# Patient Record
Sex: FEMALE | Race: WHITE | URBAN - METROPOLITAN AREA
[De-identification: names, ages, dates, MRNs, and addresses within clinical notes are randomized per-mention and may not be internally consistent; named-entity substitution may affect disease eponyms.]

---

## 2017-04-24 ENCOUNTER — HOSPITAL ENCOUNTER (EMERGENCY)
Age: 22
Discharge: HOME | End: 2017-04-24
Attending: FAMILY MEDICINE

## 2017-04-24 VITALS
OXYGEN SATURATION: 100 % | HEIGHT: 66 IN | HEART RATE: 66 BPM | DIASTOLIC BLOOD PRESSURE: 83 MMHG | RESPIRATION RATE: 16 BRPM | SYSTOLIC BLOOD PRESSURE: 129 MMHG | WEIGHT: 135 LBS | BODY MASS INDEX: 21.69 KG/M2 | TEMPERATURE: 97.6 F

## 2017-04-24 DIAGNOSIS — H10.9 CONJUNCTIVITIS OF LEFT EYE, UNSPECIFIED CONJUNCTIVITIS TYPE: Primary | ICD-10-CM

## 2017-04-24 RX ORDER — POLYMYXIN B SULFATE AND TRIMETHOPRIM 1; 10000 MG/ML; [USP'U]/ML
1 SOLUTION OPHTHALMIC EVERY 4 HOURS
Qty: 10 ML | Refills: 0 | Status: SHIPPED | OUTPATIENT
Start: 2017-04-24 | End: 2017-05-22

## 2017-04-24 NOTE — UC PROVIDER NOTE
Patient is a 25 y.o. female presenting with eye pain. The history is provided by the patient. Eye Pain    The current episode started yesterday. The problem occurs constantly. The problem has been gradually worsening. The left eye is affected. The injury mechanism was contact lenses. The pain is at a severity of 3/10. The pain is mild. There is no history of trauma to the eye. There is no known exposure to pink eye. She wears contacts. Associated symptoms include photophobia, eye redness and pain. Pertinent negatives include no discharge and no blindness. The treatment provided no relief. Past Medical History:   Diagnosis Date    Acute follicular tonsill[facility] 99/70/90    [facility]/u    Advance directive discussed with patient 05/11/2016    Cystitis 10/20/2016    [facility]    Family history of skin cancer     Ph[facility]itis 10/12/15    [facility] Donalda Sac  and pink eye    Sun-damaged skin     Sunburn, blistering     Swimmer's ear, right     Tanning bed exposure     Well woman exam with routine gynecological exam 7/24/15    didn't need pap yet         History reviewed. No pertinent surgical history. Family History   Problem Relation Age of Onset    Hypertension Father     Elevated Lipids Father     Hypertension Paternal Grandfather         Social History     Social History    Marital status: SINGLE     Spouse name: N/A    Number of children: N/A    Years of education: N/A     Occupational History    Not on file. Social History Main Topics    Smoking status: Never Smoker    Smokeless tobacco: Never Used    Alcohol use Yes      Comment: occas    Drug use: No    Sexual activity: No     Other Topics Concern    Not on file     Social History Narrative                ALLERGIES: Review of patient's allergies indicates no known allergies. Review of Systems   Constitutional: Negative. HENT: Negative.     Eyes: Positive for photophobia, pain and redness. Negative for blindness, discharge and visual disturbance. Hx of contact lens use, slept in contact lens Saturday night. Respiratory: Negative. Cardiovascular: Negative. Gastrointestinal: Negative. Endocrine: Negative. Genitourinary: Negative. Musculoskeletal: Negative. Allergic/Immunologic: Negative. Neurological: Negative for headaches. Hematological: Negative. Psychiatric/Behavioral: Negative. Vitals:    04/24/17 1143   BP: 129/83   Pulse: 66   Resp: 16   Temp: 97.6 °F (36.4 °C)   SpO2: 100%   Weight: 61.2 kg (135 lb)   Height: 5' 6\" (1.676 m)       Physical Exam   Constitutional: She is oriented to person, place, and time. She appears well-developed and well-nourished. HENT:   Head: Normocephalic and atraumatic. Right Ear: External ear normal.   Left Ear: External ear normal.   Nose: Nose normal.   Mouth/Throat: Oropharynx is clear and moist.   Eyes: EOM are normal. Pupils are equal, round, and reactive to light. Right eye exhibits no discharge. Left eye exhibits no discharge. No scleral icterus. Left eye with conjunctival irritation  No FB or abrasion under [place]     Neck: Normal range of motion. Neck supple. Cardiovascular: Normal rate, regular rhythm and normal heart sounds. Pulmonary/Chest: Effort normal and breath sounds normal.   Musculoskeletal: Normal range of motion. Neurological: She is alert and oriented to person, place, and time. Skin: Skin is warm and dry. Psychiatric: She has a normal mood and affect. Her behavior is normal. Thought content normal.   Nursing note and vitals reviewed. [facility]     Differential Diagnosis; Clinical Impression; Plan:     CLINICAL IMPRESSION:  Conjunctivitis of left eye, unspecified conjunctivitis type  (primary encounter diagnosis)    Plan:  1. Conjunctivitis. No contact lens use for the next week. Change all contact lens solution. 2. Use the Polytrim Opth drops as directed.   3. Follow up with your eye doctor or [facility]. Risk of Significant Complications, Morbidity, and/or Mortality:   Presenting problems: Moderate  Diagnostic procedures:   Moderate  Progress:   Patient progress:  Stable      Procedures

## 2017-04-24 NOTE — DISCHARGE INSTRUCTIONS
Pinkeye: Care Instructions  Your Care Instructions    Pinkeye is redness and swelling of the eye surface and the conjunctiva (the lining of the eyelid and the covering of the white part of the eye). Pinkeye is also called conjunctivitis. Pinkeye is often caused by infection with bacteria or a virus. Dry air, allergies, smoke, and chemicals are other common causes. Pinkeye often clears on its own in 7 to 10 days. Antibiotics only help if the pinkeye is caused by bacteria. Pinkeye caused by infection spreads easily. If an allergy or chemical is causing pinkeye, it will not go away unless you can avoid whatever is causing it. Follow-up care is a key part of your treatment and safety. Be sure to make and go to all appointments, and call your doctor if you are having problems. Its also a good idea to know your test results and keep a list of the medicines you take. How can you care for yourself at home? · Wash your hands often. Always wash them before and after you treat pinkeye or touch your eyes or face. · Use moist cotton or a clean, wet cloth to remove crust. Wipe from the inside corner of the eye to the outside. Use a clean part of the cloth for each wipe. · Put cold or warm wet cloths on your eye a few times a day if the eye hurts. · Do not wear contact lenses or eye makeup until the pinkeye is gone. Throw away any eye makeup you were using when you got pinkeye. Clean your contacts and storage case. If you wear disposable contacts, use a new pair when your eye has cleared and it is safe to wear contacts again. · If the doctor gave you antibiotic ointment or eyedrops, use them as directed. Use the medicine for as long as instructed, even if your eye starts looking better soon. Keep the bottle tip clean, and do not let it touch the eye area. · To put in eyedrops or ointment:  ¨ Tilt your head back, and pull your lower eyelid down with one finger.   ¨ Drop or squirt the medicine inside the lower lid.  ¨ Close your eye for 30 to 60 seconds to let the drops or ointment move around. ¨ Do not touch the ointment or dropper tip to your eyelashes or any other surface. · Do not share towels, pillows, or washcloths while you have pinkeye. When should you call for help? Call your doctor now or seek immediate medical care if:  · You have pain in your eye, not just irritation on the surface. · You have a change in vision or loss of vision. · You have an increase in discharge from the eye. · Your eye has not started to improve or begins to get worse within 48 hours after you start using antibiotics. · Pinkeye lasts longer than 7 days. Watch closely for changes in your health, and be sure to contact your doctor if you have any problems. Where can you learn more? Go to http://franklyn-yan.info/. Enter Y392 in the search box to learn more about \"Pinkeye: Care Instructions. \"  Current as of: May 27, 2016  Content Version: 11.2  © 4852-8783 [employer]. Care instructions adapted under license by [employer] (which disclaims liability or warranty for this information). If you have questions about a medical condition or this instruction, always ask your healthcare professional. [address] any warranty or liability for your use of this information.

## 2017-04-24 NOTE — LETTER
[facility] 
[address] [place] [address] 
[phone] Work/School Note Date: 4/24/2017 To Whom It May concern: 
 
Beata Mccrary was seen and treated today in the urgent care center by the following provider(s): 
Nurse Practitioner: Jaleesa Shelton NP. Beata Mccrary may return to work on 4/25/2017.  
 
Sincerely, 
 
 
 
 
Erica Lopez RN

## 2017-05-22 ENCOUNTER — HOSPITAL ENCOUNTER (EMERGENCY)
Age: 22
Discharge: HOME | End: 2017-05-22
Attending: FAMILY MEDICINE

## 2017-05-22 ENCOUNTER — HOSPITAL ENCOUNTER (OUTPATIENT)
Dept: LAB | Age: 22
Discharge: HOME | End: 2017-05-22

## 2017-05-22 VITALS
OXYGEN SATURATION: 99 % | HEIGHT: 67 IN | TEMPERATURE: 100.7 F | WEIGHT: 134.8 LBS | DIASTOLIC BLOOD PRESSURE: 75 MMHG | BODY MASS INDEX: 21.16 KG/M2 | SYSTOLIC BLOOD PRESSURE: 117 MMHG | HEART RATE: 104 BPM | RESPIRATION RATE: 18 BRPM

## 2017-05-22 DIAGNOSIS — J02.9 ACUTE PHARYNGITIS, UNSPECIFIED ETIOLOGY: Primary | ICD-10-CM

## 2017-05-22 LAB — S PYO AG THROAT QL: NEGATIVE

## 2017-05-22 PROCEDURE — 87147 CULTURE TYPE IMMUNOLOGIC: CPT | Performed by: FAMILY MEDICINE

## 2017-05-22 PROCEDURE — 87070 CULTURE OTHR SPECIMN AEROBIC: CPT | Performed by: FAMILY MEDICINE

## 2017-05-22 RX ORDER — PENICILLIN V POTASSIUM 250 MG/5ML
50 POWDER, FOR SOLUTION ORAL 3 TIMES DAILY
Qty: 611.1 ML | Refills: 0 | Status: SHIPPED | OUTPATIENT
Start: 2017-05-22 | End: 2017-06-01

## 2017-05-22 NOTE — DISCHARGE INSTRUCTIONS
Sore Throat: Care Instructions  Your Care Instructions    Infection by bacteria or a virus causes most sore throats. Cigarette smoke, dry air, air pollution, allergies, and yelling can also cause a sore throat. Sore throats can be painful and annoying. Fortunately, most sore throats go away on their own. If you have a bacterial infection, your doctor may prescribe antibiotics. Follow-up care is a key part of your treatment and safety. Be sure to make and go to all appointments, and call your doctor if you are having problems. It's also a good idea to know your test results and keep a list of the medicines you take. How can you care for yourself at home? · If your doctor prescribed antibiotics, take them as directed. Do not stop taking them just because you feel better. You need to take the full course of antibiotics. · Gargle with warm salt water once an hour to help reduce swelling and relieve discomfort. Use 1 teaspoon of salt mixed in 1 cup of warm water. · Take an over-the-counter pain medicine, such as acetaminophen (Tylenol), ibuprofen (Advil, Motrin), or naproxen (Aleve). Read and follow all instructions on the label. · Be careful when taking over-the-counter cold or flu medicines and Tylenol at the same time. Many of these medicines have acetaminophen, which is Tylenol. Read the labels to make sure that you are not taking more than the recommended dose. Too much acetaminophen (Tylenol) can be harmful. · Drink plenty of fluids. Fluids may help soothe an irritated throat. Hot fluids, such as tea or soup, may help decrease throat pain. · Use over-the-counter throat lozenges to soothe pain. Regular cough drops or hard candy may also help. These should not be given to young children because of the risk of choking. · Do not smoke or allow others to smoke around you. If you need help quitting, talk to your doctor about stop-smoking programs and medicines.  These can increase your chances of quitting for good. · Use a vaporizer or humidifier to add moisture to your bedroom. Follow the directions for cleaning the machine. When should you call for help? Call your doctor now or seek immediate medical care if:  · You have new or worse trouble swallowing. · Your sore throat gets much worse on one side. Watch closely for changes in your health, and be sure to contact your doctor if you do not get better as expected. Where can you learn more? Go to http://franklyn-yan.info/. Enter [phone] in the search box to learn more about \"Sore Throat: Care Instructions. \"  Current as of: July 29, 2016  Content Version: 11.2  © 1185-2158 [employer]. Care instructions adapted under license by [employer] (which disclaims liability or warranty for this information). If you have questions about a medical condition or this instruction, always ask your healthcare professional. [address] any warranty or liability for your use of this information.

## 2017-05-22 NOTE — UC PROVIDER NOTE
Patient is a 25 y.o. female presenting with sore throat. The history is provided by the patient. Sore Throat    This is a new problem. The current episode started yesterday. The problem has been rapidly worsening. There has been a fever of 100 - 100.9 F. The fever has been present for less than 1 day. Associated symptoms include swollen glands and trouble swallowing. She has tried nothing for the symptoms. Past Medical History:   Diagnosis Date    Acute follicular tonsillitis 73/31/27    [facility]    Advance directive discussed with patient 05/11/2016    Cystitis 10/20/2016    [facility]    Family history of skin cancer     Ph[facility]itis 10/12/15    [facility] Charl Druze  and pink eye    Sun-damaged skin     Sunburn, blistering     Swimmer's ear, right     Tanning bed exposure     Well woman exam with routine gynecological exam 7/24/15    didn't need pap yet         History reviewed. No pertinent surgical history. Family History   Problem Relation Age of Onset    Hypertension Father     Elevated Lipids Father     Hypertension Paternal Grandfather         Social History     Social History    Marital status: SINGLE     Spouse name: N/A    Number of children: N/A    Years of education: N/A     Occupational History    Not on file. Social History Main Topics    Smoking status: Never Smoker    Smokeless tobacco: Never Used    Alcohol use Yes      Comment: occas    Drug use: No    Sexual activity: No     Other Topics Concern    Not on file     Social History Narrative                ALLERGIES: Review of patient's allergies indicates no known allergies. Review of Systems   HENT: Positive for sore throat and trouble swallowing. All other systems reviewed and are negative.       Vitals:    05/22/17 1232   BP: 117/75   Pulse: (!) 104   Resp: 18   Temp: (!) 100.7 °F (38.2 °C)   SpO2: 99%   Weight: 61.1 kg (134 lb 12.8 oz)   Height: 5' 7\" (1.702 m)       Physical Exam   Constitutional: No distress. HENT:   Right Ear: Tympanic membrane and ear canal normal.   Left Ear: Tympanic membrane and ear canal normal.   Nose: Nose normal.   Mouth/Throat: Uvula swelling present. Oropharyngeal exudate (on tonsilar fold-) and posterior oropharyngeal erythema present. No posterior oropharyngeal edema. Eyes: Conjunctivae are normal. Right eye exhibits no discharge. Left eye exhibits no discharge. Neck: Neck supple. Pulmonary/Chest: Effort normal and breath sounds normal. No respiratory distress. She has no decreased breath sounds. She has no wheezes. She has no rhonchi. She has no rales. Lymphadenopathy:     She has no cervical adenopathy. Skin: No rash noted. Nursing note and vitals reviewed. MDM     Differential Diagnosis; Clinical Impression; Plan:     CLINICAL IMPRESSION:  Acute pharyngitis, unspecified etiology  (primary encounter diagnosis)      DDX    Plan:    Rapid strep- negative  Start penicillin and motrin . Fluids/ gargles. Amount and/or Complexity of Data Reviewed:    Review and summarize past medical records:  Yes  Risk of Significant Complications, Morbidity, and/or Mortality:   Presenting problems: Moderate  Diagnostic procedures:  Low  Management options:   Moderate  Progress:   Patient progress:  Stable      Procedures

## 2017-05-25 LAB
BACTERIA SPEC CULT: ABNORMAL
BACTERIA SPEC CULT: ABNORMAL
SERVICE CMNT-IMP: ABNORMAL

## 2017-05-31 ENCOUNTER — OFFICE VISIT (OUTPATIENT)
Dept: FAMILY MEDICINE CLINIC | Age: 22
End: 2017-05-31

## 2017-05-31 VITALS
RESPIRATION RATE: 16 BRPM | BODY MASS INDEX: 21.14 KG/M2 | HEIGHT: 67 IN | DIASTOLIC BLOOD PRESSURE: 92 MMHG | HEART RATE: 75 BPM | OXYGEN SATURATION: 98 % | SYSTOLIC BLOOD PRESSURE: 116 MMHG | TEMPERATURE: 97.3 F | WEIGHT: 134.7 LBS

## 2017-05-31 DIAGNOSIS — R03.0 ELEVATED BLOOD PRESSURE READING IN OFFICE WITHOUT DIAGNOSIS OF HYPERTENSION: ICD-10-CM

## 2017-05-31 DIAGNOSIS — Z82.49 FH: HTN (HYPERTENSION): ICD-10-CM

## 2017-05-31 DIAGNOSIS — J32.9 SINOBRONCHITIS: Primary | ICD-10-CM

## 2017-05-31 DIAGNOSIS — J40 SINOBRONCHITIS: Primary | ICD-10-CM

## 2017-05-31 RX ORDER — AZITHROMYCIN 250 MG/1
TABLET, FILM COATED ORAL
Qty: 6 TAB | Refills: 0 | Status: SHIPPED | OUTPATIENT
Start: 2017-05-31 | End: 2017-06-02

## 2017-05-31 NOTE — PROGRESS NOTES
Working at [employer] Begins at [facility] in August.  Still with ST. Cough up clear. On  antibiotic > week. Leaving to [place] on Sunday. No chills/sweats since first few days. Iniitial fatigue improved. Hoarse since beginning. Visit Vitals    BP (!) 116/92 (BP 1 Location: Right arm, BP Patient Position: Sitting)    Pulse 75    Temp 97.3 °F (36.3 °C) (Oral)    Resp 16    Ht 5' 7\" (1.702 m)    Wt 134 lb 11.2 oz (61.1 kg)    LMP 05/03/2017    SpO2 98%    BMI 21.1 kg/m2       Patient alert and cooperative. Lungs clear. Hoarse voice. Mouth - posterior pharynx, anterior pillar erythema, tonsils non erythematous, no exudate. Assessment:  1. Sinobronchitis. Plan:  1. Switch to Z-Lukas.  2. Take probiotics. 3. Warned of possible ineffectiveness of PCP during current cycle and use backup method. 4. Recheck here otherwise prn.

## 2017-05-31 NOTE — PROGRESS NOTES
Chief Complaint   Patient presents with    Cough     5/22/17 went to [facility] given Pen V K 250 mg Tid and it does not seem to be helping. Sore throat has improved. Coughing up clear mucus. No fever. 1. Have you been to the ER, urgent care clinic since your last visit? Hospitalized since your last visit? Yes When: 5/22/17 Where: [facility] Reason for visit: Sore throat and cough    2. Have you seen or consulted any other health care providers outside of the [address] [place] [address] since your last visit? Include any pap smears or colon screening. No       The patient was counseled on the dangers of tobacco use, and Patient is a non smoker. Reviewed strategies to maximize success, including Continue not to smoke. I have reviewed Health Maintenance with the patient and updated. Advance Care Planning information reviewed and given to the patient.

## 2017-05-31 NOTE — MR AVS SNAPSHOT
Visit Information Date & Time Provider Department Dept. Phone Encounter #  
 5/31/2017  8:00 AM Jarvis Salazar MD [facility] [phone] [id number] Upcoming Health Maintenance Date Due Hepatitis A Peds Age 1-18 (1 of 2 - Standard Series) 8/29/2017* INFLUENZA AGE 9 TO ADULT 8/1/2017 DTaP/Tdap/Td series (6 - Td) 7/16/2018 *Topic was postponed. The date shown is not the original due date. Allergies as of 5/31/2017  Review Complete On: 5/31/2017 By: Manuel Johnson RN No Known Allergies Current Immunizations  Reviewed on 5/31/2017 Name Date DTAP Vaccine 7/25/2000, 9/14/1998, 1997, 1997, 1997 HIB Vaccine 6/15/1998, 1997, 1997 HPV 9/26/2015, 7/24/2015 HPV (9-valent) 1/15/2016 Hepatitis B Vaccine 6/27/2007, 8/11/1998, 1997 IPV 7/6/2001, 6/15/1998, 1997, 1997 MMR Vaccine 7/6/2001, 6/15/1998 Meningococcal (MCV4P) Vaccine 8/5/2015  5:05 PM, 4/17/2013 TDAP Vaccine 7/16/2008 Varicella Virus Vaccine Live 11/26/2012, 8/11/1998 Reviewed by Manuel Johnson RN on 5/31/2017 at  8:15 AM  
You Were Diagnosed With   
  
 Codes Comments Sinobronchitis    -  Primary ICD-10-CM: J32.9, J40 ICD-9-CM: 473.9, 490 Elevated blood pressure reading in office without diagnosis of hypertension     ICD-10-CM: R03.0 ICD-9-CM: 796.2 FH: HTN (hypertension)     ICD-10-CM: Z82.49 
ICD-9-CM: V17.49 Vitals BP Pulse Temp Resp Height(growth percentile) Weight(growth percentile) (!) 116/92 (BP 1 Location: Right arm, BP Patient Position: Sitting) 75 97.3 °F (36.3 °C) (Oral) 16 5' 7\" (1.702 m) 134 lb 11.2 oz (61.1 kg) LMP SpO2 BMI OB Status Smoking Status 05/03/2017 98% 21.1 kg/m2 Having regular periods Never Smoker Vitals History BMI and BSA Data Body Mass Index Body Surface Area  
 21.1 kg/m 2 1.7 m 2 Preferred Pharmacy Pharmacy Name Phone [facility] [place], [place] - [address] [place] [address] Christine Credit [phone] Your Updated Medication List  
  
   
This list is accurate as of: 5/31/17  8:43 AM.  Always use your most recent med list.  
  
  
  
  
 azithromycin 250 mg tablet Commonly known as:  Balta Logan Take 2 tablets today, then take 1 tablet daily  
  
 norethindrone-ethinyl estradiol 1 mg-20 mcg (21)/75 mg (7) Tab Commonly known as:  Mare Kobus FE 1/20 penicillin v potassium 250 mg/5 mL suspension Commonly known as:  VEETID Take 20.37 mL by mouth three (3) times daily for 10 days. Prescriptions Sent to Pharmacy Refills  
 azithromycin (ZITHROMAX) 250 mg tablet 0 Sig: Take 2 tablets today, then take 1 tablet daily Class: Normal  
 Pharmacy: [address], [address] [place] [address] Ph #: [phone] Introducing [facility] SERVICES! Manuel Araiza introduces [employer] patient portal. Now you can access parts of your medical record, email your doctor's office, and request medication refills online. 1. In your internet browser, go to [email or web address] 2. Click on the First Time User? Click Here link in the Sign In box. You will see the New Member Sign Up page. 3. Enter your [employer] Access Code exactly as it appears below. You will not need to use this code after youve completed the sign-up process. If you do not sign up before the expiration date, you must request a new code. · [employer] Access Code: [email or web address] Expires: 7/23/2017 11:34 AM 
 
4. Enter the last four digits of your Social Security Number (xxxx) and Date of Birth (mm/dd/yyyy) as indicated and click Submit. You will be taken to the next sign-up page. 5. Create a [employer] ID. This will be your [employer] login ID and cannot be changed, so think of one that is secure and easy to remember. 6. Create a [employer] password. You can change your password at any time. 7. Enter your Password Reset Question and Answer. This can be used at a later time if you forget your password. 8. Enter your e-mail address. You will receive e-mail notification when new information is available in [address] 9. Click Sign Up. You can now view and download portions of your medical record. 10. Click the Download Summary menu link to download a portable copy of your medical information. If you have questions, please visit the Frequently Asked Questions section of the [employer] website. Remember, [employer] is NOT to be used for urgent needs. For medical emergencies, dial 911. Now available from your iPhone and Android! Please provide this summary of care documentation to your next provider. Your primary care clinician is listed as [id number] If you have any questions after today's visit, please call [phone].

## 2017-06-02 ENCOUNTER — APPOINTMENT (OUTPATIENT)
Dept: GENERAL RADIOLOGY | Age: 22
End: 2017-06-02
Attending: FAMILY MEDICINE

## 2017-06-02 ENCOUNTER — HOSPITAL ENCOUNTER (EMERGENCY)
Age: 22
Discharge: HOME | End: 2017-06-02
Attending: FAMILY MEDICINE

## 2017-06-02 VITALS
DIASTOLIC BLOOD PRESSURE: 82 MMHG | BODY MASS INDEX: 21.2 KG/M2 | OXYGEN SATURATION: 100 % | HEIGHT: 67 IN | TEMPERATURE: 98.3 F | HEART RATE: 68 BPM | SYSTOLIC BLOOD PRESSURE: 128 MMHG | RESPIRATION RATE: 18 BRPM | WEIGHT: 135.1 LBS

## 2017-06-02 DIAGNOSIS — R10.31 ABDOMINAL PAIN, RIGHT LOWER QUADRANT: Primary | ICD-10-CM

## 2017-06-02 DIAGNOSIS — R19.7 DIARRHEA, UNSPECIFIED TYPE: ICD-10-CM

## 2017-06-02 LAB
BILIRUB UR QL: NEGATIVE
GLUCOSE UR QL STRIP.AUTO: NEGATIVE MG/DL
HCG UR QL: NEGATIVE
KETONES UR-MCNC: NEGATIVE MG/DL
LEUKOCYTE ESTERASE UR QL STRIP: NEGATIVE
NITRITE UR QL: NEGATIVE
PH UR: 7 [PH] (ref 5–8)
PROT UR QL: NEGATIVE MG/DL
RBC # UR STRIP: NEGATIVE /UL
SP GR UR: 1.01 (ref 1–1.03)
UROBILINOGEN UR QL: 0.2 EU/DL (ref 0.2–1)

## 2017-06-02 RX ORDER — AZITHROMYCIN 250 MG/1
TABLET, FILM COATED ORAL
Qty: 6 TAB | Refills: 0 | Status: SHIPPED | OUTPATIENT
Start: 2017-06-02 | End: 2017-06-02

## 2017-06-02 NOTE — DISCHARGE INSTRUCTIONS
Abdominal Pain: Care Instructions  Your Care Instructions    Abdominal pain has many possible causes. Some aren't serious and get better on their own in a few days. Others need more testing and treatment. If your pain continues or gets worse, you need to be rechecked and may need more tests to find out what is wrong. You may need surgery to correct the problem. Don't ignore new symptoms, such as fever, nausea and vomiting, urination problems, pain that gets worse, and dizziness. These may be signs of a more serious problem. Your doctor may have recommended a follow-up visit in the next 8 to 12 hours. If you are not getting better, you may need more tests or treatment. The doctor has checked you carefully, but problems can develop later. If you notice any problems or new symptoms, get medical treatment right away. Follow-up care is a key part of your treatment and safety. Be sure to make and go to all appointments, and call your doctor if you are having problems. It's also a good idea to know your test results and keep a list of the medicines you take. How can you care for yourself at home? · Rest until you feel better. · To prevent dehydration, drink plenty of fluids, enough so that your urine is light yellow or clear like water. Choose water and other caffeine-free clear liquids until you feel better. If you have kidney, heart, or liver disease and have to limit fluids, talk with your doctor before you increase the amount of fluids you drink. · If your stomach is upset, eat mild foods, such as rice, dry toast or crackers, bananas, and applesauce. Try eating several small meals instead of two or three large ones. · Wait until 48 hours after all symptoms have gone away before you have spicy foods, alcohol, and drinks that contain caffeine. · Do not eat foods that are high in fat. · Avoid anti-inflammatory medicines such as aspirin, ibuprofen (Advil, Motrin), and naproxen (Aleve).  These can cause stomach upset. Talk to your doctor if you take daily aspirin for another health problem. When should you call for help? Call 911 anytime you think you may need emergency care. For example, call if:  · You passed out (lost consciousness). · You pass maroon or very bloody stools. · You vomit blood or what looks like coffee grounds. · You have new, severe belly pain. Call your doctor now or seek immediate medical care if:  · Your pain gets worse, especially if it becomes focused in one area of your belly. · You have a new or higher fever. · Your stools are black and look like tar, or they have streaks of blood. · You have unexpected vaginal bleeding. · You have symptoms of a urinary tract infection. These may include:  ¨ Pain when you urinate. ¨ Urinating more often than usual.  ¨ Blood in your urine. · You are dizzy or lightheaded, or you feel like you may faint. Watch closely for changes in your health, and be sure to contact your doctor if:  · You are not getting better after 1 day (24 hours). Where can you learn more? Go to http://franklynWara[employer] Industriesyan.info/. Enter R301 in the search box to learn more about \"Abdominal Pain: Care Instructions. \"  Current as of: May 27, 2016  Content Version: 11.2  © 4815-2100 [employer]. Care instructions adapted under license by [employer] (which disclaims liability or warranty for this information). If you have questions about a medical condition or this instruction, always ask your healthcare professional. [place] [address] any warranty or liability for your use of this information. Diarrhea: Care Instructions  Your Care Instructions    Diarrhea is loose, watery stools (bowel movements). The exact cause is often hard to find. Sometimes diarrhea is your body's way of getting rid of what caused an upset stomach. Viruses, food poisoning, and many medicines can cause diarrhea.  Some people get diarrhea in response to emotional stress, anxiety, or certain foods. Almost everyone has diarrhea now and then. It usually isn't serious, and your stools will return to normal soon. The important thing to do is replace the fluids you have lost, so you can prevent dehydration. The doctor has checked you carefully, but problems can develop later. If you notice any problems or new symptoms, get medical treatment right away. Follow-up care is a key part of your treatment and safety. Be sure to make and go to all appointments, and call your doctor if you are having problems. It's also a good idea to know your test results and keep a list of the medicines you take. How can you care for yourself at home? · Watch for signs of dehydration, which means your body has lost too much water. Dehydration is a serious condition and should be treated right away. Signs of dehydration are:  ¨ Increasing thirst and dry eyes and mouth. ¨ Feeling faint or lightheaded. ¨ Darker urine, and a smaller amount of urine than normal.  · To prevent dehydration, drink plenty of fluids, enough so that your urine is light yellow or clear like water. Choose water and other caffeine-free clear liquids until you feel better. If you have kidney, heart, or liver disease and have to limit fluids, talk with your doctor before you increase the amount of fluids you drink. · Begin eating small amounts of mild foods the next day, if you feel like it. ¨ Try yogurt that has live cultures of Lactobacillus. (Check the label.)  ¨ Avoid spicy foods, fruits, alcohol, and caffeine until 48 hours after all symptoms are gone. ¨ Avoid chewing gum that contains sorbitol. ¨ Avoid dairy products (except for yogurt with Lactobacillus) while you have diarrhea and for 3 days after symptoms are gone. · The doctor may recommend that you take over-the-counter medicine, such as loperamide (Imodium), if you still have diarrhea after 6 hours.  Read and follow all instructions on the label. Do not use this medicine if you have bloody diarrhea, a high fever, or other signs of serious illness. Call your doctor if you think you are having a problem with your medicine. When should you call for help? Call 911 anytime you think you may need emergency care. For example, call if:  · You passed out (lost consciousness). · Your stools are maroon or very bloody. Call your doctor now or seek immediate medical care if:  · You are dizzy or lightheaded, or you feel like you may faint. · Your stools are black and look like tar, or they have streaks of blood. · You have new or worse belly pain. · You have symptoms of dehydration, such as:  ¨ Dry eyes and a dry mouth. ¨ Passing only a little dark urine. ¨ Feeling thirstier than usual.  · You have a new or higher fever. Watch closely for changes in your health, and be sure to contact your doctor if:  · Your diarrhea is getting worse. · You see pus in the diarrhea. · You are not getting better after 2 days (48 hours). Where can you learn more? Go to http://franklyn-yan.info/. Enter Z993 in the search box to learn more about \"Diarrhea: Care Instructions. \"  Current as of: May 27, 2016  Content Version: 11.2  © 8026-8491 [employer]. Care instructions adapted under license by [employer] (which disclaims liability or warranty for this information). If you have questions about a medical condition or this instruction, always ask your healthcare professional. [place] [address] any warranty or liability for your use of this information.

## 2017-06-02 NOTE — UC PROVIDER NOTE
Patient is a 23 y.o. female presenting with abdominal pain. The history is provided by the patient. Abdominal Pain    This is a new problem. The current episode started yesterday (reports being on PCN, then [place] recently). The problem occurs constantly. The problem has not changed since onset. The pain is located in the RLQ. The quality of the pain is aching. The pain is at a severity of 4/10. Associated symptoms include diarrhea (started 2 days ago) and constipation (started 1 week ago). Pertinent negatives include no anorexia, no fever, no nausea, no vomiting, no dysuria, no frequency, no hematuria, no headaches, no myalgias, no chest pain and no back pain. Past Medical History:   Diagnosis Date    Acute follicular tonsillitis 14/81/54    [facility] f/u    Advance directive discussed with patient 05/11/2016    Cystitis 10/20/2016    [facility]    Family history of skin cancer     Pharyngitis 10/12/15    [facility] Suzecalista Calderon  and pink eye    Sun-damaged skin     Sunburn, blistering     Swimmer's ear, right     Tanning bed exposure     Well woman exam with routine gynecological exam 7/24/15    didn't need pap yet         No past surgical history on file. Family History   Problem Relation Age of Onset    Hypertension Father     Elevated Lipids Father     Hypertension Paternal Grandfather         Social History     Social History    Marital status: SINGLE     Spouse name: N/A    Number of children: N/A    Years of education: N/A     Occupational History    Not on file. Social History Main Topics    Smoking status: Never Smoker    Smokeless tobacco: Never Used    Alcohol use Yes      Comment: occas    Drug use: No    Sexual activity: No     Other Topics Concern    Not on file     Social History Narrative                ALLERGIES: Review of patient's allergies indicates no known allergies.     Review of Systems   Constitutional: Negative for chills and fever. Respiratory: Negative for shortness of breath and wheezing. Cardiovascular: Negative for chest pain and palpitations. Gastrointestinal: Positive for abdominal pain, constipation (started 1 week ago) and diarrhea (started 2 days ago). Negative for anorexia, nausea and vomiting. Genitourinary: Negative for dysuria, frequency and hematuria. Musculoskeletal: Negative for back pain and myalgias. Skin: Negative for rash. Neurological: Negative for dizziness and headaches. Vitals:    06/02/17 1422   BP: 128/82   Pulse: 68   Resp: 18   Temp: 98.3 °F (36.8 °C)   SpO2: 100%   Weight: 61.3 kg (135 lb 1.6 oz)   Height: 5' 7\" (1.702 m)       Physical Exam   Constitutional: She appears well-developed and well-nourished. No distress. Cardiovascular: Normal rate, regular rhythm and normal heart sounds. Pulmonary/Chest: Effort normal and breath sounds normal. No respiratory distress. She has no wheezes. She has no rales. Abdominal: Soft. Bowel sounds are normal. She exhibits no distension. There is tenderness in the right lower quadrant. There is no rigidity, no rebound, no guarding, no CVA tenderness, no tenderness at McBurney's point and negative Sprague's sign. Neurological: She is alert. Skin: She is not diaphoretic. Psychiatric: She has a normal mood and affect. Her behavior is normal. Judgment and thought content normal.   Nursing note and vitals reviewed. MDM     Differential Diagnosis; Clinical Impression; Plan:     CLINICAL IMPRESSION:  Abdominal pain, right lower quadrant  (primary encounter diagnosis)  Diarrhea, unspecified type    Plan:  1. Advised to go to ER but patient declined  2. Check stool Cx, Cdiff  3. Increase Fluids  4.  ER if worse  Amount and/or Complexity of Data Reviewed:   Clinical lab tests:  Ordered and reviewed  Tests in the radiology section of CPT®:  Ordered and reviewed  Risk of Significant Complications, Morbidity, and/or Mortality:   Presenting problems: Moderate  Diagnostic procedures: Moderate  Management options:   Moderate  Progress:   Patient progress:  Stable      Procedures

## 2017-08-24 ENCOUNTER — OFFICE VISIT (OUTPATIENT)
Dept: FAMILY MEDICINE CLINIC | Age: 22
End: 2017-08-24

## 2017-08-24 VITALS
RESPIRATION RATE: 16 BRPM | BODY MASS INDEX: 21.19 KG/M2 | HEIGHT: 67 IN | DIASTOLIC BLOOD PRESSURE: 86 MMHG | SYSTOLIC BLOOD PRESSURE: 113 MMHG | OXYGEN SATURATION: 99 % | TEMPERATURE: 98.7 F | WEIGHT: 135 LBS | HEART RATE: 79 BPM

## 2017-08-24 DIAGNOSIS — H57.11 EYE PAIN, RIGHT: Primary | ICD-10-CM

## 2017-08-24 DIAGNOSIS — H57.89 REDNESS OF RIGHT EYE: ICD-10-CM

## 2017-08-24 PROBLEM — R03.0 ELEVATED BLOOD PRESSURE READING IN OFFICE WITHOUT DIAGNOSIS OF HYPERTENSION: Status: RESOLVED | Noted: 2017-05-31 | Resolved: 2017-08-24

## 2017-08-24 RX ORDER — POLYMYXIN B SULFATE AND TRIMETHOPRIM 1; 10000 MG/ML; [USP'U]/ML
1 SOLUTION OPHTHALMIC EVERY 4 HOURS
Qty: 10 ML | Refills: 0 | Status: SHIPPED | OUTPATIENT
Start: 2017-08-24 | End: 2017-08-29

## 2017-08-24 NOTE — PROGRESS NOTES
Chief Complaint   Patient presents with   Gomer Doll Eye     Right eye was red on 8/23/17. Sore this AM.  Sleeps with contacts in at times. 1. Have you been to the ER, urgent care clinic since your last visit? Hospitalized since your last visit? Yes When: 6/2/17 [facility]: [facility] Reason for visit: Abdominal pain    2. Have you seen or consulted any other health care providers outside of the [address] [place] [address] since your last visit? Include any pap smears or colon screening. No       I have reviewed Health Maintenance with the patient and updated. Advance Care Planning information reviewed and given to the patient at a previous visit. The patient was counseled on the dangers of tobacco use, and Patient is a non smoker. Reviewed strategies to maximize success, including Continue not to smoke.

## 2017-08-24 NOTE — PROGRESS NOTES
Noted some redness yesterday. Just put in new contacts yesterday. Slept with contacts last night. Sore when awoke. Less so now. Visit Vitals    /86 (BP 1 Location: Right arm, BP Patient Position: Sitting)    Pulse 79    Temp 98.7 °F (37.1 °C) (Oral)    Resp 16    Ht 5' 7\" (1.702 m)    Wt 135 lb (61.2 kg)    LMP 08/21/2017    SpO2 99%    BMI 21.14 kg/m2       Patient alert and cooperative. Conjunctiva with mild injection. Some irregularity of the cornea with otoscope appreciated. Assessment:  1. Right eye redness and pain, questionable cornea disruption from wearing new pair of contacts overnight. Plan:  1. Script for topical antibiotic drop to use for about five days. 2. Set up eye appointment for next week if this does not improve. Can always cancel if better. 3. Recheck here otherwise prn.

## 2017-08-24 NOTE — MR AVS SNAPSHOT
Visit Information Date & Time Provider Department Dept. Phone Encounter #  
 8/24/2017  2:00 PM Sharon Hawkins MD [facility] [phone] [id number] Follow-up Instructions Return if symptoms worsen or fail to improve. Upcoming Health Maintenance Date Due Hepatitis A Peds Age 1-18 (1 of 2 - Standard Series) 8/29/2017* DTaP/Tdap/Td series (6 - Td) 7/16/2018 *Topic was postponed. The date shown is not the original due date. Allergies as of 8/24/2017  Review Complete On: 8/24/2017 By: Anoop Araiza RN No Known Allergies Current Immunizations  Reviewed on 5/31/2017 Name Date DTAP Vaccine 7/25/2000, 9/14/1998, 1997, 1997, 1997 HIB Vaccine 6/15/1998, 1997, 1997 HPV 9/26/2015, 7/24/2015 HPV (9-valent) 1/15/2016 Hepatitis B Vaccine 6/27/2007, 8/11/1998, 1997 IPV 7/6/2001, 6/15/1998, 1997, 1997 MMR Vaccine 7/6/2001, 6/15/1998 Meningococcal (MCV4P) Vaccine 8/5/2015  5:05 PM, 4/17/2013 TDAP Vaccine 7/16/2008 Varicella Virus Vaccine Live 11/26/2012, 8/11/1998 Not reviewed this visit You Were Diagnosed With   
  
 Codes Comments Eye pain, right    -  Primary ICD-10-CM: H57.11 ICD-9-CM: 379.91 Redness of right eye     ICD-10-CM: H57.8 ICD-9-CM: 379.93 Vitals BP Pulse Temp Resp Height(growth percentile) Weight(growth percentile) 113/86 (BP 1 Location: Right arm, BP Patient Position: Sitting) 79 98.7 °F (37.1 °C) (Oral) 16 5' 7\" (1.702 m) 135 lb (61.2 kg) LMP SpO2 BMI OB Status Smoking Status 08/21/2017 99% 21.14 kg/m2 Having regular periods Never Smoker Vitals History BMI and BSA Data Body Mass Index Body Surface Area  
 21.14 kg/m 2 1.7 m 2 Preferred Pharmacy Pharmacy Name Phone [facility] [address] [place], [place] - [address] [place] [address] Balta Rodríguez [phone] Your Updated Medication List  
  
   
 This list is accurate as of: 8/24/17  2:32 PM.  Always use your most recent med list.  
  
  
  
  
 norethindrone-ethinyl estradiol 1 mg-20 mcg (21)/75 mg (7) Tab Commonly known as:  JUNEL FE 1/20  
  
 trimethoprim-polymyxin b ophthalmic solution Commonly known as:  POLYTRIM Administer 1 Drop to right eye every four (4) hours for 5 days. Prescriptions Sent to Pharmacy Refills  
 trimethoprim-polymyxin b (POLYTRIM) ophthalmic solution 0 Sig: Administer 1 Drop to right eye every four (4) hours for 5 days. Class: Normal  
 Pharmacy: [address], [address] [place] [address] #: [phone] Route: Right Eye Follow-up Instructions Return if symptoms worsen or fail to improve. Introducing [facility]! [facility] introduces [employer]ir patient portal. Now you can access parts of your medical record, email your doctor's office, and request medication refills online. 1. In your internet browser, go to [email or web address] 2. Click on the First Time User? Click Here link in the Sign In box. You will see the New Member Sign Up page. 3. Enter your Harir Access Code exactly as it appears below. You will not need to use this code after youve completed the sign-up process. If you do not sign up before the expiration date, you must request a new code. · Harir Access Code: [email or web address] Expires: 11/22/2017  2:32 PM 
 
4. Enter the last four digits of your Social Security Number (xxxx) and Date of Birth (mm/dd/yyyy) as indicated and click Submit. You will be taken to the next sign-up page. 5. Create a "[employer]t ID. This will be your Harir login ID and cannot be changed, so think of one that is secure and easy to remember. 6. Create a Harir password. You can change your password at any time. 7. Enter your Password Reset Question and Answer. This can be used at a later time if you forget your password. 8. Enter your e-mail address. You will receive e-mail notification when new information is available in [address] 9. Click Sign Up. You can now view and download portions of your medical record. 10. Click the Download Summary menu link to download a portable copy of your medical information. If you have questions, please visit the Frequently Asked Questions section of the [employer] website. Remember, [employer] is NOT to be used for urgent needs. For medical emergencies, dial 911. Now available from your iPhone and Android! Please provide this summary of care documentation to your next provider. Your primary care clinician is listed as [phone] If you have any questions after today's visit, please call [phone].

## 2018-01-04 ENCOUNTER — OFFICE VISIT (OUTPATIENT)
Dept: FAMILY MEDICINE CLINIC | Age: 23
End: 2018-01-04

## 2018-01-04 VITALS
DIASTOLIC BLOOD PRESSURE: 88 MMHG | RESPIRATION RATE: 18 BRPM | WEIGHT: 140 LBS | SYSTOLIC BLOOD PRESSURE: 119 MMHG | BODY MASS INDEX: 21.97 KG/M2 | HEART RATE: 63 BPM | HEIGHT: 67 IN | TEMPERATURE: 98.2 F | OXYGEN SATURATION: 97 %

## 2018-01-04 DIAGNOSIS — F41.9 ANXIETY: ICD-10-CM

## 2018-01-04 DIAGNOSIS — F33.1 MODERATE EPISODE OF RECURRENT MAJOR DEPRESSIVE DISORDER (HCC): Primary | ICD-10-CM

## 2018-01-04 PROBLEM — F32.A ANXIETY AND DEPRESSION: Status: ACTIVE | Noted: 2018-01-04

## 2018-01-04 RX ORDER — ESCITALOPRAM OXALATE 20 MG/1
20 TABLET ORAL DAILY
Qty: 30 TAB | Refills: 3 | Status: SHIPPED | OUTPATIENT
Start: 2018-01-04 | End: 2018-03-28 | Stop reason: SDUPTHER

## 2018-01-04 NOTE — PROGRESS NOTES
Identified pt with two pt identifiers(name and ). Reviewed record in preparation for visit and have obtained necessary documentation. Chief Complaint   Patient presents with    Anxiety     patient would like to discuss being put on medication for anxiety         Health Maintenance Due   Topic    Hepatitis A Peds Age 1-18 (1 of 2 - Standard Series)       Coordination of Care Questionnaire:  :   1) Have you been to an emergency room, urgent care clinic since your last visit? no   Hospitalized since your last visit? no             2. Have seen or consulted any other health care provider since your last visit? NO  If yes, where when, and reason for visit? 3) Do you have an Advanced Directive/ Living Will in place? NO  If yes, do we have a copy on file NO  If no, would you like information NO    Patient is accompanied by self I have received verbal consent from Zane Tucker to discuss any/all medical information while they are present in the room.
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
## 2018-01-04 NOTE — PATIENT INSTRUCTIONS
1) Look for an exercise class at school - yoga, dance, weightlifting - something that you will enjoy and get you involved with other people  Please call health services at [facility] and find out about counseling sessions. Counseling will help with anxiety and depression and is recommended in conjunction with medication. Think about getting a different job near [employer] - check out babysitting for professors on campus      Clinical depression is a medical condition that goes beyond everyday sadness. Depression may cause serious, long-lasting symptoms and often disrupts a persons ability to perform routine tasks. The disorder is the most common psychiatric disorder worldwide. In the [place], about one in six people experiences a bout of clinical depression in their lifetime. Lexapro 20 mg has been prescribed for you. It is a SSRI,(Selective Serotonin Reuptake Inhibitors). SSRIs work on a chemical neurotransmitter, called serotonin, which can affect mood, social behavior, and sleep. Please take this medication daily. Most people feel an effect within 1-2 weeks of starting the medication, but it can take up to 6-12 weeks to feel the full effect of the anti-depressant. Its important to keep in mind that each persons response to antidepressants is different, so give it a few weeks to start working. If you are having uncomfortable side effects, tell your healthcare provider. Some side effects go away over time, but others do not, and it might be possible to find a dose or alternate medication that causes fewer side effects. Finding the right medication (or combination of medications) and the right doses can take some trial and error, so try not to get discouraged. Also consider counseling. Research shows that psychotherapy and antidepressants may be the best approach. · Keep the numbers for these national suicide hotlines: 9-507-732-TALK (7-768.278.9112) and 0-251-DILTTKA (1-566.174.9105).  If you or someone you know talks about suicide or feeling hopeless, get help right away. Anxiety is a common problem. It affects all kinds of people. There is no reason to feel embarrassed about getting treatment for anxiety. Whether you have occasional anxiety or a diagnosable disorder, there are steps you can take to help minimize and manage feeling of anxiety. Everyone feels anxious or nervous once in a while. That is normal. But being extremely anxious or worried on most days for 6 months or longer is not normal, and is considered a medical problem. This is called \"generalized anxiety disorder. \" The disorder can make it hard to do everyday tasks. Generalized anxiety disorder is just one anxiety disorder. There are others, such as panic disorder and phobias. Symptoms of extreme or severe anxiety:  People with extreme or severe anxiety feel very worried or \"on edge\" much of the time. They can have trouble sleeping or forget things. Plus, they can have physical symptoms. For instance, people with severe anxiety often feel very tired and have tense muscles. Some get stomach aches or feel chest \"tightness. \"    Steps you can do on your own to feel better:  Deep breathing exercises: Deep breathing triggers a relaxation response, helping to change from the \"fight-or-flight\" response anxiety brings on. Inhale slowly to a count of 4, starting at your belly and then moving into your chest.  Gently hold your breath for 4 counts, then slowly exhale to 4 counts. Exercise can help many people feel less anxious. Regular cardiovascular exercise (such as fast walking,) release endorphins - the \"feel good\" hormone in our body - and can reduce anxiety. Proper sleep:  Sleep is important to overall health. Not getting enough sleep can cause fatigue, inattention, and irritabililty, causing anxiety levels to increase.   Healthy Diet: a healthy diet rich in whole grains, vegetables and fruits is healthier than simple carbohydrates found in processed foods. Skipping meals can cause blood sugar to drop and cause jittery feelings that could worsening underlying anxiety. It also a good idea to cut down on or stop drinking coffee and other sources of caffeine. Caffeine can make anxiety worse. Medical treatments include:  ? Psychotherapy  Psychotherapy involves meeting with a mental health counselor to talk about your feelings, relationships, and worries. Therapy can help you find new ways of thinking about your situation so that you feel less anxious. In therapy, you might also learn new skills to reduce anxiety. ? Medicines  Medicines used to treat depression can relieve anxiety, too, even in people who are not depressed. Some people have psychotherapy and take medicines at the same time. Yoga is a type of exercise in which you move your body into various positions in order to become more fit or flexible, to improve your breathing, and to relax your mind. It dates back over 5,000 years with roots in [address] [facility]. There are numerous styles of yoga, but overall, yoga combines meditation/relaxation, physical movements, and breathing techniques. According to the Martha-Amilcar, scientific research has shown yoga can reduce pain and improve function in people suffering from chronic lower back pain. Other studies also suggest that practicing yoga (as well as other forms of regular exercise) might improve quality of life; reduce stress; lower heart rate and blood pressure; help relieve anxiety, depression, and insomnia; and improve overall physical fitness, strength, and flexibility. Everyones body is different, and although yoga is low impact and safe for most people,  postures should be modified based on individual abilities. People with high blood pressure, glaucoma, or sciatica, and women who are pregnant should modify or avoid some yoga poses.  The movements of yoga are subtle, so it is a good idea to start with professional instructions to ensure you are doing it correctly. Carefully selecting an instructor who is experienced and attentive to your needs is an important step toward helping you practice yoga safely. There are many gyms and yoga studios in this area that offer free trial classes so sign up for one and explore!

## 2018-01-04 NOTE — MR AVS SNAPSHOT
Visit Information Date & Time Provider Department Dept. Phone Encounter #  
 1/4/2018  1:40 PM Norberto Brooks NP [facility] [phone] [id number] Upcoming Health Maintenance Date Due Hepatitis A Peds Age 1-18 (1 of 2 - Standard Series) 5/28/1998 DTaP/Tdap/Td series (6 - Td) 7/16/2018 Allergies as of 1/4/2018  Review Complete On: 6/0/3508 By: Binh Jane RN No Known Allergies Current Immunizations  Reviewed on 5/31/2017 Name Date DTAP Vaccine 7/25/2000, 9/14/1998, 1997, 1997, 1997 HIB Vaccine 6/15/1998, 1997, 1997 HPV 9/26/2015, 7/24/2015 HPV (9-valent) 1/15/2016 Hepatitis B Vaccine 6/27/2007, 8/11/1998, 1997 IPV 7/6/2001, 6/15/1998, 1997, 1997 MMR Vaccine 7/6/2001, 6/15/1998 Meningococcal (MCV4P) Vaccine 8/5/2015  5:05 PM, 4/17/2013 TDAP Vaccine 7/16/2008 Varicella Virus Vaccine Live 11/26/2012, 8/11/1998 Not reviewed this visit You Were Diagnosed With   
  
 Codes Comments Moderate episode of recurrent major depressive disorder ([facility]    -  Primary ICD-10-CM: F33.1 ICD-9-CM: 296.32 Vitals BP Pulse Temp Resp Height(growth percentile) Weight(growth percentile) 119/88 (BP 1 Location: Right arm, BP Patient Position: Sitting) 63 98.2 °F (36.8 °C) (Oral) 18 5' 7\" (1.702 m) 140 lb (63.5 kg) LMP SpO2 BMI OB Status Smoking Status 12/30/2017 97% 21.93 kg/m2 Having regular periods Never Smoker Vitals History BMI and BSA Data Body Mass Index Body Surface Area  
 21.93 kg/m 2 1.73 m 2 Preferred Pharmacy Pharmacy Name Phone [facility] [address] [place] [address] [phone] Your Updated Medication List  
  
   
This list is accurate as of: 1/4/18  3:10 PM.  Always use your most recent med list.  
  
  
  
  
 escitalopram oxalate 20 mg tablet Commonly known as:  Anna Benavidez Take 1 Tab by mouth daily. Indications: Generalized Anxiety Disorder  
  
 norethindrone-ethinyl estradiol 1 mg-20 mcg (21)/75 mg (7) Tab Commonly known as:  Lamont FALCON 1/20 Prescriptions Sent to Pharmacy Refills  
 escitalopram oxalate (LEXAPRO) 20 mg tablet 3 Sig: Take 1 Tab by mouth daily. Indications: Generalized Anxiety Disorder Class: Normal  
 Pharmacy: [address] [facility], [address] [place] [address] #: [phone] Route: Oral  
  
Patient Instructions 1) Look for an exercise class at school - yoga, dance, weightlifting - something that you will enjoy and get you involved with other people Please call health services at [facility] and find out about counseling sessions. Counseling will help with anxiety and depression and is recommended in conjunction with medication. Think about getting a different job near [place] - check out babysitting for professors on campus Clinical depression is a medical condition that goes beyond everyday sadness. Depression may cause serious, long-lasting symptoms and often disrupts a persons ability to perform routine tasks. The disorder is the most common psychiatric disorder worldwide. In the [place], about one in six people experiences a bout of clinical depression in their lifetime. Lexapro 20 mg has been prescribed for you. It is a SSRI,(Selective Serotonin Reuptake Inhibitors). SSRIs work on a chemical neurotransmitter, called serotonin, which can affect mood, social behavior, and sleep. Please take this medication daily. Most people feel an effect within 1-2 weeks of starting the medication, but it can take up to 6-12 weeks to feel the full effect of the anti-depressant. Its important to keep in mind that each persons response to antidepressants is different, so give it a few weeks to start working.  If you are having uncomfortable side effects, tell your healthcare provider. Some side effects go away over time, but others do not, and it might be possible to find a dose or alternate medication that causes fewer side effects. Finding the right medication (or combination of medications) and the right doses can take some trial and error, so try not to get discouraged. Also consider counseling. Research shows that psychotherapy and antidepressants may be the best approach. · Keep the numbers for these national suicide hotlines: 0-490-653-TALK (4-894.880.8483) and 3-602-LMYIBKE (7-999.801.4872). If you or someone you know talks about suicide or feeling hopeless, get help right away. Anxiety is a common problem. It affects all kinds of people. There is no reason to feel embarrassed about getting treatment for anxiety. Whether you have occasional anxiety or a diagnosable disorder, there are steps you can take to help minimize and manage feeling of anxiety. Everyone feels anxious or nervous once in a while. That is normal. But being extremely anxious or worried on most days for 6 months or longer is not normal, and is considered a medical problem. This is called \"generalized anxiety disorder. \" The disorder can make it hard to do everyday tasks. Generalized anxiety disorder is just one anxiety disorder. There are others, such as panic disorder and phobias. Symptoms of extreme or severe anxiety: 
People with extreme or severe anxiety feel very worried or \"on edge\" much of the time. They can have trouble sleeping or forget things. Plus, they can have physical symptoms. For instance, people with severe anxiety often feel very tired and have tense muscles. Some get stomach aches or feel chest \"tightness. \" 
 
Steps you can do on your own to feel better: 
Deep breathing exercises: Deep breathing triggers a relaxation response, helping to change from the \"fight-or-flight\" response anxiety brings on. Inhale slowly to a count of 4, starting at your belly and then moving into your chest.  Gently hold your breath for 4 counts, then slowly exhale to 4 counts. Exercise can help many people feel less anxious. Regular cardiovascular exercise (such as fast walking,) release endorphins - the \"feel good\" hormone in our body - and can reduce anxiety. Proper sleep:  Sleep is important to overall health. Not getting enough sleep can cause fatigue, inattention, and irritabililty, causing anxiety levels to increase. Healthy Diet: a healthy diet rich in whole grains, vegetables and fruits is healthier than simple carbohydrates found in processed foods. Skipping meals can cause blood sugar to drop and cause jittery feelings that could worsening underlying anxiety. It also a good idea to cut down on or stop drinking coffee and other sources of caffeine. Caffeine can make anxiety worse. Medical treatments include: ? Psychotherapy  Psychotherapy involves meeting with a mental health counselor to talk about your feelings, relationships, and worries. Therapy can help you find new ways of thinking about your situation so that you feel less anxious. In therapy, you might also learn new skills to reduce anxiety. ? Medicines  Medicines used to treat depression can relieve anxiety, too, even in people who are not depressed. Some people have psychotherapy and take medicines at the same time. Yoga is a type of exercise in which you move your body into various positions in order to become more fit or flexible, to improve your breathing, and to relax your mind. It dates back over 5,000 years with roots in [address] [facility]. There are numerous styles of yoga, but overall, yoga combines meditation/relaxation, physical movements, and breathing techniques.  
 
According to the Martha-Amilcar, scientific research has shown yoga can reduce pain and improve function in people suffering from chronic lower back pain. Other studies also suggest that practicing yoga (as well as other forms of regular exercise) might improve quality of life; reduce stress; lower heart rate and blood pressure; help relieve anxiety, depression, and insomnia; and improve overall physical fitness, strength, and flexibility. Everyones body is different, and although yoga is low impact and safe for most people,  postures should be modified based on individual abilities. People with high blood pressure, glaucoma, or sciatica, and women who are pregnant should modify or avoid some yoga poses. The movements of yoga are subtle, so it is a good idea to start with professional instructions to ensure you are doing it correctly. Carefully selecting an instructor who is experienced and attentive to your needs is an important step toward helping you practice yoga safely. There are many gyms and yoga studios in this area that offer free trial classes so sign up for one and explore! Introducing hospitals & HEALTH SERVICES! Alivia Shah introduces [employer] patient portal. Now you can access parts of your medical record, email your doctor's office, and request medication refills online. 1. In your internet browser, go to [email or web address] 2. Click on the First Time User? Click Here link in the Sign In box. You will see the New Member Sign Up page. 3. Enter your [employer] Access Code exactly as it appears below. You will not need to use this code after youve completed the sign-up process. If you do not sign up before the expiration date, you must request a new code. · [employer] Access Code: [email or web address] Expires: 4/4/2018  1:39 PM 
 
4. Enter the last four digits of your Social Security Number (xxxx) and Date of Birth (mm/dd/yyyy) as indicated and click Submit. You will be taken to the next sign-up page. 5. Create a [employer] ID.  This will be your [employer] login ID and cannot be changed, so think of one that is secure and easy to remember. 6. Create a [employer] password. You can change your password at any time. 7. Enter your Password Reset Question and Answer. This can be used at a later time if you forget your password. 8. Enter your e-mail address. You will receive e-mail notification when new information is available in [address] 9. Click Sign Up. You can now view and download portions of your medical record. 10. Click the Download Summary menu link to download a portable copy of your medical information. If you have questions, please visit the Frequently Asked Questions section of the [employer] website. Remember, [employer] is NOT to be used for urgent needs. For medical emergencies, dial 911. Now available from your iPhone and Android! Please provide this summary of care documentation to your next provider. Your primary care clinician is listed as [id number] If you have any questions after today's visit, please call [phone].

## 2018-01-12 ENCOUNTER — TELEPHONE (OUTPATIENT)
Dept: FAMILY MEDICINE CLINIC | Age: 23
End: 2018-01-12

## 2018-01-12 NOTE — TELEPHONE ENCOUNTER
OAPL 1/12/18 @1448  Spoke with pt. States she is taking lexapro and already feels better. Still has high anxiety about school since classes just started. Has joined a different gym and is going there. Will look for work out louise and a yoga class or something to take. Has appt to [facility] for med check in 4 weeks.

## 2018-01-25 ENCOUNTER — OFFICE VISIT (OUTPATIENT)
Dept: FAMILY MEDICINE CLINIC | Age: 23
End: 2018-01-25

## 2018-01-25 ENCOUNTER — HOSPITAL ENCOUNTER (OUTPATIENT)
Dept: LAB | Age: 23
Discharge: HOME | End: 2018-01-25
Payer: COMMERCIAL

## 2018-01-25 VITALS
SYSTOLIC BLOOD PRESSURE: 115 MMHG | TEMPERATURE: 97.9 F | RESPIRATION RATE: 18 BRPM | DIASTOLIC BLOOD PRESSURE: 81 MMHG | OXYGEN SATURATION: 96 % | WEIGHT: 138 LBS | HEIGHT: 67 IN | BODY MASS INDEX: 21.66 KG/M2 | HEART RATE: 73 BPM

## 2018-01-25 DIAGNOSIS — J02.9 SORE THROAT: ICD-10-CM

## 2018-01-25 DIAGNOSIS — J03.90 TONSILLITIS: Primary | ICD-10-CM

## 2018-01-25 DIAGNOSIS — J03.90 TONSILLITIS: ICD-10-CM

## 2018-01-25 LAB
BASOPHILS # BLD: 0 K/UL (ref 0–0.06)
BASOPHILS NFR BLD: 0 % (ref 0–2)
DIFFERENTIAL METHOD BLD: ABNORMAL
EOSINOPHIL # BLD: 0.1 K/UL (ref 0–0.4)
EOSINOPHIL NFR BLD: 1 % (ref 0–5)
ERYTHROCYTE [DISTWIDTH] IN BLOOD BY AUTOMATED COUNT: 13.4 % (ref 11.6–14.5)
HCT VFR BLD AUTO: 38.7 % (ref 35–45)
HGB BLD-MCNC: 12.2 G/DL (ref 12–16)
LYMPHOCYTES # BLD: 1.3 K/UL (ref 0.9–3.6)
LYMPHOCYTES NFR BLD: 9 % (ref 21–52)
MCH RBC QN AUTO: 29.7 PG (ref 24–34)
MCHC RBC AUTO-ENTMCNC: 31.5 G/DL (ref 31–37)
MCV RBC AUTO: 94.2 FL (ref 74–97)
MONOCYTES # BLD: 0.8 K/UL (ref 0.05–1.2)
MONOCYTES NFR BLD: 6 % (ref 3–10)
NEUTS SEG # BLD: 11.9 K/UL (ref 1.8–8)
NEUTS SEG NFR BLD: 84 % (ref 40–73)
PLATELET # BLD AUTO: 196 K/UL (ref 135–420)
PMV BLD AUTO: 12.6 FL (ref 9.2–11.8)
RBC # BLD AUTO: 4.11 M/UL (ref 4.2–5.3)
S PYO AG THROAT QL: NEGATIVE
VALID INTERNAL CONTROL?: YES
WBC # BLD AUTO: 14.2 K/UL (ref 4.6–13.2)

## 2018-01-25 PROCEDURE — 85025 COMPLETE CBC W/AUTO DIFF WBC: CPT | Performed by: FAMILY MEDICINE

## 2018-01-25 PROCEDURE — 86664 EPSTEIN-BARR NUCLEAR ANTIGEN: CPT | Performed by: FAMILY MEDICINE

## 2018-01-25 PROCEDURE — 86308 HETEROPHILE ANTIBODY SCREEN: CPT | Performed by: FAMILY MEDICINE

## 2018-01-25 RX ORDER — ESCITALOPRAM OXALATE 20 MG/1
20 TABLET ORAL DAILY
COMMUNITY
End: 2018-03-28 | Stop reason: SDUPTHER

## 2018-01-25 RX ORDER — AMOXICILLIN 875 MG/1
875 TABLET, FILM COATED ORAL 2 TIMES DAILY
Qty: 20 TAB | Refills: 0 | Status: SHIPPED | OUTPATIENT
Start: 2018-01-25 | End: 2018-02-04

## 2018-01-25 NOTE — MR AVS SNAPSHOT
David Roe Carter [facility] 83 [id number] 
[phone] Patient: Raffy Mcgowan MRN: [id number] :1995 Visit Information Date & Time Provider Department Dept. Phone Encounter #  
 2018 10:30 AM Nichole Alvares MD [address] [place] [address] [phone] [id number] Follow-up Instructions Return if symptoms worsen or fail to improve. Upcoming Health Maintenance Date Due Hepatitis A Peds Age 1-18 (1 of 2 - Standard Series) 1998 DTaP/Tdap/Td series (1 - Tdap) 2004 HPV AGE 9Y-26Y (1 of 3 - Female 3 Dose Series) 2008 Influenza Age 5 to Adult 2017 Allergies as of 2018  Review Complete On: 2018 By: Nichole Alvares MD  
 No Known Allergies Current Immunizations  Never Reviewed No immunizations on file. Not reviewed this visit You Were Diagnosed With   
  
 Codes Comments Tonsillitis    -  Primary ICD-10-CM: J03.90 ICD-9-CM: 890 Sore throat     ICD-10-CM: J02.9 ICD-9-CM: 627 Vitals BP Pulse Temp Resp Height(growth percentile) Weight(growth percentile) 115/81 (BP 1 Location: Right arm, BP Patient Position: Sitting) 73 97.9 °F (36.6 °C) (Oral) 18 5' 7\" (1.702 m) 138 lb (62.6 kg) LMP SpO2 BMI Smoking Status 2018 96% 21.61 kg/m2 Light Tobacco Smoker Vitals History BMI and BSA Data Body Mass Index Body Surface Area  
 21.61 kg/m 2 1.72 m 2 Preferred Pharmacy Pharmacy Name Phone [id number], [address] [place] [address] Melissa Mujica [phone] Your Updated Medication List  
  
   
This list is accurate as of: 18 10:50 AM.  Always use your most recent med list.  
  
  
  
  
 amoxicillin 875 mg tablet Commonly known as:  AMOXIL Take 1 Tab by mouth two (2) times a day for 10 days. LEXAPRO 20 mg tablet Generic drug:  escitalopram oxalate Take 20 mg by mouth daily. Prescriptions Sent to Pharmacy Refills  
 amoxicillin (AMOXIL) 875 mg tablet 0 Sig: Take 1 Tab by mouth two (2) times a day for 10 days. Class: Normal  
 Pharmacy: [address] [place] [address], [address] [place] [address] #: [phone] Route: Oral  
  
We Performed the Following AMB POC RAPID STREP A [11565 CPT(R)] Follow-up Instructions Return if symptoms worsen or fail to improve. To-Do List   
 01/25/2018 Lab:  CBC WITH AUTOMATED DIFF   
  
 01/25/2018 Lab:  MONO SCREEN W/ REFLX EBV Patient Instructions I'm still starting treatment for strep because the clinical picture is strep. Finish the ful lcourse of antibiotics unless the mono test comes back positive tomorrow We will call you with the lab results tomorrow. Introducing [facility] & HEALTH SERVICES! Kailyn Albert introduces Bettery patient portal. Now you can access parts of your medical record, email your doctor's office, and request medication refills online. 1. In your internet browser, go to [email or web address] 2. Click on the First Time User? Click Here link in the Sign In box. You will see the New Member Sign Up page. 3. Enter your Bettery Access Code exactly as it appears below. You will not need to use this code after youve completed the sign-up process. If you do not sign up before the expiration date, you must request a new code. · Bettery Access Code: [email or web address] Expires: 4/25/2018 10:50 AM 
 
4. Enter the last four digits of your Social Security Number (xxxx) and Date of Birth (mm/dd/yyyy) as indicated and click Submit. You will be taken to the next sign-up page. 5. Create a Bettery ID. This will be your Bettery login ID and cannot be changed, so think of one that is secure and easy to remember. 6. Create a Bettery password. You can change your password at any time. 7. Enter your Password Reset Question and Answer.  This can be used at a later time if you forget your password. 8. Enter your e-mail address. You will receive e-mail notification when new information is available in [address] 9. Click Sign Up. You can now view and download portions of your medical record. 10. Click the Download Summary menu link to download a portable copy of your medical information. If you have questions, please visit the Frequently Asked Questions section of the Onfan website. Remember, Onfan is NOT to be used for urgent needs. For medical emergencies, dial 911. Now available from your iPhone and Android! Please provide this summary of care documentation to your next provider. If you have any questions after today's visit, please call [phone].

## 2018-01-25 NOTE — PROGRESS NOTES
HISTORY OF PRESENT ILLNESS  Eugenia Robledo is a 23 y.o. female. HPI Comments: Jan Delgado is here because of a bad sore throat for 2 days, especially the R side. . She feels very hot at times, hasn't checked her temp. No cough, slight headache. Sore Throat    Pertinent negatives include no vomiting, no headaches, no shortness of breath and no cough. Review of Systems   Constitutional: Positive for chills and fever. HENT: Positive for sore throat. Eyes: Negative for blurred vision and double vision. Respiratory: Negative for cough and shortness of breath. Cardiovascular: Negative for chest pain and palpitations. Gastrointestinal: Negative for abdominal pain, nausea and vomiting. Neurological: Negative for headaches. Physical Exam   Constitutional: Vital signs are normal. She appears well-developed and well-nourished. HENT:   Right Ear: Tympanic membrane and ear canal normal.   Left Ear: Tympanic membrane and ear canal normal.   Nose: Nose normal.   Mouth/Throat: Uvula is midline, oropharynx is clear and moist and mucous membranes are normal.   Throat mod red, no exudate. Uvula midline   Eyes: Pupils are equal, round, and reactive to light. Neck: No thyromegaly present. Cardiovascular: Normal rate and regular rhythm. Pulmonary/Chest: Effort normal and breath sounds normal. No respiratory distress. She has no wheezes. She has no rales. Abdominal: She exhibits no distension. There is no tenderness. Lymphadenopathy:     She has no cervical adenopathy. Skin: No rash noted. Nursing note and vitals reviewed. Results for orders placed or performed in visit on 01/25/18   AMB POC RAPID STREP A   Result Value Ref Range    VALID INTERNAL CONTROL POC Yes     Group A Strep Ag Negative Negative       ASSESSMENT and PLAN    ICD-10-CM ICD-9-CM    1. Strep tonsillitis J03.00 034.0 AMB POC RAPID STREP A   2.  Sore throat J02.9 462 AMB POC RAPID STREP A       Clinically strep, will start treatment but check for mono.

## 2018-01-25 NOTE — LETTER
NOTIFICATION RETURN TO WORK / SCHOOL 
 
1/25/2018 10:44 AM 
 
Ms. Kate Peres [facility] [address]  [address] To Whom It May Concern: 
 
Kate Peres is currently under the care of [facility]. She will return to work/school on: 1/29/2018. Excuse until then for medical reasons. If there are questions or concerns please have the patient contact our office. Sincerely, Oumar Day MD

## 2018-01-25 NOTE — PATIENT INSTRUCTIONS
I'm still starting treatment for strep because the clinical picture is strep. Finish the full course of antibiotics unless the mono test comes back positive tomorrow    We will call you with the lab results tomorrow.

## 2018-01-25 NOTE — PROGRESS NOTES
Rm: 3    Chief Complaint   Patient presents with    Sore Throat     sore throat, chills, sweats, right side ear ache x 3 days      Flu Shot Requested: no    Depression Screening:  No flowsheet data found. Learning Assessment:  Learning Assessment 1/25/2018   PRIMARY LEARNER Patient   HIGHEST LEVEL OF EDUCATION - PRIMARY LEARNER  SOME COLLEGE   PRIMARY LANGUAGE ENGLISH   LEARNER PREFERENCE PRIMARY LISTENING   ANSWERED BY patient   RELATIONSHIP SELF       Abuse Screening:  No flowsheet data found. Health Maintenance reviewed and discussed per provider: yes     Coordination of Care:    1. Have you been to the ER, urgent care clinic since your last visit? Hospitalized since your last visit? no    2. Have you seen or consulted any other health care providers outside of the [address] [place] [address] since your last visit? Include any pap smears or colon screening.  no

## 2018-01-26 NOTE — PROGRESS NOTES
Advise pt that her blood count looks like a bacterial infection, most likely strep (not viral).  Make sure that she finishes the full course of Amoxil

## 2018-01-26 NOTE — PROGRESS NOTES
Spoke with pt. I informed her of her results and asked how she was feeling. Pt stated she started to feel better. I advised her to finish the whole dose of abx and to call if she doesn't get any better. Pt verbalized understanding.

## 2018-01-26 NOTE — PROGRESS NOTES
Called pt to inform her of her lab results. Pt did not answer. Left a message to call the office back with the office number.

## 2018-01-27 LAB
EBV EA IGG SER-ACNC: <9 U/ML (ref 0–8.9)
EBV NA IGG SER-ACNC: 455 U/ML (ref 0–17.9)
EBV VCA IGG SER-ACNC: 153 U/ML (ref 0–17.9)
EBV VCA IGM SER-ACNC: <36 U/ML (ref 0–35.9)
HETEROPH AB SER QL: NEGATIVE
INTERPRETATION, 169995: ABNORMAL

## 2018-02-26 ENCOUNTER — TELEPHONE (OUTPATIENT)
Dept: FAMILY MEDICINE CLINIC | Age: 23
End: 2018-02-26

## 2018-02-26 NOTE — TELEPHONE ENCOUNTER
Spoke with patient after obtaining 2 patient identifiers  Per patient she will be on spring break in about a week from [employer]. She will make an apponitment to come in once she sees her work schedule. She was made aware that she cant get a refill until she is seen. She states she is doing well. Verbalized understanding with no questions noted at this time.

## 2018-03-28 DIAGNOSIS — F33.1 MODERATE EPISODE OF RECURRENT MAJOR DEPRESSIVE DISORDER (HCC): ICD-10-CM

## 2018-03-28 NOTE — TELEPHONE ENCOUNTER
PCP: Briseyda Love MD    Last appt: 1/4/2018  No future appointments. Requested Prescriptions     Pending Prescriptions Disp Refills    escitalopram oxalate (LEXAPRO) 20 mg tablet 30 Tab 1     Sig: Take 1 Tab by mouth daily. Indications: Generalized Anxiety Disorder     No results found for: NA, K, CL, CO2, AGAP, GLU, BUN, CREA, BUCR, GFRAA, GFRNA, CA, GFRAA  No results found for: HBA1C, HGBE8, ILG2LBHJ, GZT3RAQJ  No results found for: CHOL, CHOLPOCT, CHOLX, CHLST, CHOLV, HDL, HDLPOC, LDL, LDLCPOC, LDLC, DLDLP, VLDLC, VLDL, TGLX, TRIGL, TRIGP, TGLPOCT, CHHD, CHHDX  No results found for: TSH, TSH2, TSH3, TSHP, TSHEXT    Patient is in college in [place] and can not miss classes now. The med is working well with no problems and she is calling for a refill. She can come for an office visit in the Summer when she is out of classes.

## 2018-03-28 NOTE — TELEPHONE ENCOUNTER
----- Message from [facility] sent at 3/28/2018  1:34 PM EDT -----  Regarding: SLAVA Bullard/Telephone  Pt would like a call back from a nurse in regards to one of her medications? Pt best contact [phone].

## 2018-03-29 RX ORDER — ESCITALOPRAM OXALATE 20 MG/1
20 TABLET ORAL DAILY
Qty: 30 TAB | Refills: 1 | Status: SHIPPED | OUTPATIENT
Start: 2018-03-29 | End: 2018-08-20

## 2018-03-29 NOTE — TELEPHONE ENCOUNTER
Message left on VM that med was sent to pharmacy and she will need an OV this summer before any more refills.

## 2018-07-02 ENCOUNTER — OFFICE VISIT (OUTPATIENT)
Dept: FAMILY MEDICINE CLINIC | Age: 23
End: 2018-07-02

## 2018-07-02 VITALS
SYSTOLIC BLOOD PRESSURE: 122 MMHG | WEIGHT: 155 LBS | HEART RATE: 58 BPM | OXYGEN SATURATION: 100 % | RESPIRATION RATE: 16 BRPM | DIASTOLIC BLOOD PRESSURE: 88 MMHG | HEIGHT: 67 IN | TEMPERATURE: 98.1 F | BODY MASS INDEX: 24.33 KG/M2

## 2018-07-02 DIAGNOSIS — H10.9 BACTERIAL CONJUNCTIVITIS OF RIGHT EYE: Primary | ICD-10-CM

## 2018-07-02 RX ORDER — CIPROFLOXACIN HYDROCHLORIDE 3.5 MG/ML
2 SOLUTION/ DROPS TOPICAL 4 TIMES DAILY
Qty: 5 ML | Status: SHIPPED | OUTPATIENT
Start: 2018-07-02 | End: 2018-07-02 | Stop reason: CLARIF

## 2018-07-02 RX ORDER — CIPROFLOXACIN HYDROCHLORIDE 3.5 MG/ML
2 SOLUTION/ DROPS TOPICAL 4 TIMES DAILY
Qty: 5 ML | Refills: 0 | Status: SHIPPED | OUTPATIENT
Start: 2018-07-02 | End: 2018-07-07

## 2018-07-02 NOTE — MR AVS SNAPSHOT
[address] [place] [phone] 
[phone] Patient: Christopher Preez MRN: [id number] :1995 Visit Information Date & Time Provider Department Dept. Phone Encounter #  
 2018  9:15 AM Venus Garay, [address] [phone] [id number] Follow-up Instructions Return if symptoms worsen or fail to improve. Upcoming Health Maintenance Date Due Pneumococcal 19-64 Medium Risk (1 of 1 - PPSV23) 2016 PAP AKA CERVICAL CYTOLOGY 2018 DTaP/Tdap/Td series (6 - Td) 2018 Influenza Age 5 to Adult 2018 Allergies as of 2018  Review Complete On: 2018 By: Maritza Martinez LPN No Known Allergies Current Immunizations  Reviewed on 2017 Name Date DTAP Vaccine 2000, 1998, 1997, 1997, 1997 HIB Vaccine 6/15/1998, 1997, 1997 HPV 2015, 2015 HPV (9-valent) 1/15/2016 Hepatitis B Vaccine 2007, 1998, 1997 IPV 2001, 6/15/1998, 1997, 1997 MMR Vaccine 2001, 6/15/1998 Meningococcal (MCV4P) Vaccine 2015  5:05 PM, 2013 TDAP Vaccine 2008 Varicella Virus Vaccine Live 2012, 1998 Not reviewed this visit You Were Diagnosed With   
  
 Codes Comments Bacterial conjunctivitis of right eye    -  Primary ICD-10-CM: H10.9 ICD-9-CM: 372.39, 041.9 Vitals BP Pulse Temp Resp Height(growth percentile) Weight(growth percentile) 122/88 (BP 1 Location: Right arm, BP Patient Position: Sitting) (!) 58 98.1 °F (36.7 °C) (Oral) 16 5' 7\" (1.702 m) 155 lb (70.3 kg) LMP SpO2 BMI OB Status Smoking Status 2018 (Approximate) 100% 24.28 kg/m2 Having regular periods Light Tobacco Smoker Vitals History BMI and BSA Data Body Mass Index Body Surface Area  
 24.28 kg/m 2 1.82 m 2 Preferred Pharmacy Pharmacy Name Phone [id number] [address] [facility] Erum Weber [phone] Your Updated Medication List  
  
   
This list is accurate as of 7/2/18  9:31 AM.  Always use your most recent med list.  
  
  
  
  
 ciprofloxacin HCl 0.3 % ophthalmic solution Commonly known as:  Marycruz Bishop Administer 2 Drops to right eye four (4) times daily for 5 days. escitalopram oxalate 20 mg tablet Commonly known as:  Marcell Camilo Take 1 Tab by mouth daily. Indications: Generalized Anxiety Disorder  
  
 norethindrone-ethinyl estradiol 1 mg-20 mcg (21)/75 mg (7) Tab Commonly known as:  Karla Stiven FE 1/20 Prescriptions Printed Refills  
 ciprofloxacin HCl (CILOXAN) 0.3 % ophthalmic solution ml Sig: Administer 2 Drops to right eye four (4) times daily for 5 days. Class: Print Route: Right Eye Follow-up Instructions Return if symptoms worsen or fail to improve. Patient Instructions Use the drops as instructed. Recheck Thursday if it's not a lot better. Introducing [facility] & HEALTH SERVICES! Yoon Quispe introduces Gdd Hcanalytics patient portal. Now you can access parts of your medical record, email your doctor's office, and request medication refills online. 1. In your internet browser, go to [email or web address] 2. Click on the First Time User? Click Here link in the Sign In box. You will see the New Member Sign Up page. 3. Enter your Gdd Hcanalytics Access Code exactly as it appears below. You will not need to use this code after youve completed the sign-up process. If you do not sign up before the expiration date, you must request a new code. · Gdd Hcanalytics Access Code: [email or web address] Expires: 9/30/2018  9:31 AM 
 
4. Enter the last four digits of your Social Security Number (xxxx) and Date of Birth (mm/dd/yyyy) as indicated and click Submit. You will be taken to the next sign-up page. 5. Create a fsboWOW ID. This will be your fsboWOW login ID and cannot be changed, so think of one that is secure and easy to remember. 6. Create a fsboWOW password. You can change your password at any time. 7. Enter your Password Reset Question and Answer. This can be used at a later time if you forget your password. 8. Enter your e-mail address. You will receive e-mail notification when new information is available in [address] 9. Click Sign Up. You can now view and download portions of your medical record. 10. Click the Download Summary menu link to download a portable copy of your medical information. If you have questions, please visit the Frequently Asked Questions section of the fsboWOW website. Remember, fsboWOW is NOT to be used for urgent needs. For medical emergencies, dial 911. Now available from your iPhone and Android! Please provide this summary of care documentation to your next provider. Your primary care clinician is listed as [id number] If you have any questions after today's visit, please call [phone].

## 2018-07-02 NOTE — LETTER
NOTIFICATION RETURN TO WORK / SCHOOL 
 
7/2/2018 9:33 AM 
 
Ms. Andra Soulier [address] [place] [address] [address] [place] [phone] To Whom It May Concern: 
 
Andra Soulier is currently under the care of [facility]. She will return to work/school on: 7/4/2018. Excuse until then for medical reasons. If there are questions or concerns please have the patient contact our office. Sincerely, Ailin Anderson MD

## 2018-07-02 NOTE — PROGRESS NOTES
HISTORY OF PRESENT ILLNESS  Jerral Cabot Rondell Spaniel is a 26 y.o. female. HPI Comments: Ms. Elkin Sanchez is an otherwise healthy 27 yo female who presents w/ R eye pain, discharge, redness, swelling and photophobia since 0730 yesterday. She reports recent h/o conjunctivitis a few months ago. She states that her symptoms are similar to her prior episode. She has tried cold wash cloths and previous ABX drops with no relief. She is a contact lens wearer and admits to occasionally sleeping in her daily contacts. She works in a bar and grocery store. She denies any double vision, fever, chills or headache. Pink Eye    Associated symptoms include discharge, photophobia, eye redness and pain. Pertinent negatives include no blurred vision, no double vision, no nausea, no vomiting and no fever. Review of Systems   Constitutional: Negative for chills and fever. Eyes: Positive for photophobia, pain, discharge and redness. Negative for blurred vision and double vision. Respiratory: Negative for cough and shortness of breath. Cardiovascular: Negative for chest pain and palpitations. Gastrointestinal: Negative for abdominal pain, nausea and vomiting. Neurological: Negative for headaches. Physical Exam   Constitutional: Vital signs are normal. She appears well-developed and well-nourished. HENT:   Right Ear: Tympanic membrane and ear canal normal.   Left Ear: Tympanic membrane and ear canal normal.   Nose: Nose normal.   Mouth/Throat: Uvula is midline, oropharynx is clear and moist and mucous membranes are normal.   Eyes: Pupils are equal, round, and reactive to light. R conjunctiva red. Neck: No thyromegaly present. Cardiovascular: Normal rate and regular rhythm. Pulmonary/Chest: Effort normal and breath sounds normal. No respiratory distress. She has no wheezes. She has no rales. Abdominal: She exhibits no distension. Lymphadenopathy:     She has no cervical adenopathy. Skin: No rash noted. Psychiatric: She has a normal mood and affect. Nursing note and vitals reviewed.       ASSESSMENT and PLAN    ICD-10-CM ICD-9-CM    1. Bacterial conjunctivitis of right eye H10.9 372.39      041.9        AVS instructions reviewed with patient, pt verbalized understanding

## 2018-07-02 NOTE — PROGRESS NOTES
Rm 1  Pt presents to the clinic complaining of right eye swelling and irritation. Pt thinks she has pink eye. Depression Screening:  PHQ over the last two weeks 1/4/2018 5/31/2017 5/11/2016   Little interest or pleasure in doing things Several days Not at all Not at all   Feeling down, depressed or hopeless Several days Not at all Not at all   Total Score PHQ 2 2 0 0       Learning Assessment:  Learning Assessment 1/25/2018 8/5/2015   PRIMARY LEARNER Patient Patient   HIGHEST LEVEL OF EDUCATION - PRIMARY LEARNER  SOME COLLEGE GRADUATED HIGH SCHOOL OR GED   PRIMARY LANGUAGE ENGLISH ENGLISH   LEARNER PREFERENCE PRIMARY LISTENING DEMONSTRATION     - LISTENING     - DEMONSTRATION   ANSWERED BY patient patient   RELATIONSHIP SELF SELF       Abuse Screening:  No flowsheet data found. Health Maintenance reviewed and discussed per provider: yes     Coordination of Care:    1. Have you been to the ER, urgent care clinic since your last visit? Hospitalized since your last visit? no    2. Have you seen or consulted any other health care providers outside of the [address] [place] [address] since your last visit? Include any pap smears or colon screening.  no

## 2018-08-20 ENCOUNTER — HOSPITAL ENCOUNTER (OUTPATIENT)
Dept: LAB | Age: 23
Discharge: HOME | End: 2018-08-20
Payer: COMMERCIAL

## 2018-08-20 ENCOUNTER — OFFICE VISIT (OUTPATIENT)
Dept: FAMILY MEDICINE CLINIC | Age: 23
End: 2018-08-20

## 2018-08-20 VITALS
HEART RATE: 68 BPM | OXYGEN SATURATION: 99 % | DIASTOLIC BLOOD PRESSURE: 85 MMHG | SYSTOLIC BLOOD PRESSURE: 122 MMHG | BODY MASS INDEX: 24.61 KG/M2 | WEIGHT: 156.8 LBS | TEMPERATURE: 98 F | HEIGHT: 67 IN | RESPIRATION RATE: 12 BRPM

## 2018-08-20 DIAGNOSIS — N76.1 SUBACUTE VAGINITIS: ICD-10-CM

## 2018-08-20 DIAGNOSIS — N30.00 ACUTE CYSTITIS WITHOUT HEMATURIA: Primary | ICD-10-CM

## 2018-08-20 DIAGNOSIS — R30.0 DYSURIA: ICD-10-CM

## 2018-08-20 DIAGNOSIS — R10.30 LOWER ABDOMINAL PAIN: ICD-10-CM

## 2018-08-20 LAB
BILIRUB UR QL STRIP: NEGATIVE
GLUCOSE UR-MCNC: NORMAL MG/DL
HCG URINE, QL. (POC): NEGATIVE
KETONES P FAST UR STRIP-MCNC: NEGATIVE MG/DL
PH UR STRIP: 6 [PH] (ref 4.6–8)
PROT UR QL STRIP: NEGATIVE
SP GR UR STRIP: 1.02 (ref 1–1.03)
UA UROBILINOGEN AMB POC: NORMAL (ref 0.2–1)
URINALYSIS CLARITY POC: CLEAR
URINALYSIS COLOR POC: NORMAL
URINE BLOOD POC: NEGATIVE
URINE LEUKOCYTES POC: NORMAL
URINE NITRITES POC: POSITIVE
VALID INTERNAL CONTROL?: YES

## 2018-08-20 PROCEDURE — 87491 CHLMYD TRACH DNA AMP PROBE: CPT | Performed by: FAMILY MEDICINE

## 2018-08-20 RX ORDER — CIPROFLOXACIN 500 MG/1
500 TABLET ORAL 2 TIMES DAILY
Qty: 6 TAB | Refills: 0 | Status: SHIPPED | OUTPATIENT
Start: 2018-08-20 | End: 2018-08-23

## 2018-08-20 NOTE — MR AVS SNAPSHOT
[address] [place] [address] 83 [phone] 
[phone] Patient: Mercy Rodriguez MRN: [id number] :1995 Visit Information Date & Time Provider Department Dept. Phone Encounter #  
 2018  2:00 PM Sudhaalissa [facility] [phone] [id number] Follow-up Instructions Return if symptoms worsen or fail to improve. Upcoming Health Maintenance Date Due Pneumococcal 19-64 Medium Risk (1 of 1 - PPSV23) 2016 PAP AKA CERVICAL CYTOLOGY 2018 DTaP/Tdap/Td series (6 - Td) 2018 Influenza Age 5 to Adult 2018 Allergies as of 2018  Review Complete On: 2018 By: Maine Moreno No Known Allergies Current Immunizations  Reviewed on 2017 Name Date DTAP Vaccine 2000, 1998, 1997, 1997, 1997 HIB Vaccine 6/15/1998, 1997, 1997 HPV 2015, 2015 HPV (9-valent) 1/15/2016 Hepatitis B Vaccine 2007, 1998, 1997 IPV 2001, 6/15/1998, 1997, 1997 MMR Vaccine 2001, 6/15/1998 Meningococcal (MCV4P) Vaccine 2015  5:05 PM, 2013 TDAP Vaccine 2008 Varicella Virus Vaccine Live 2012, 1998 Not reviewed this visit You Were Diagnosed With   
  
 Codes Comments Acute cystitis without hematuria    -  Primary ICD-10-CM: N30.00 ICD-9-CM: 595.0 Dysuria     ICD-10-CM: R30.0 ICD-9-CM: 788.1 Lower abdominal pain     ICD-10-CM: R10.30 ICD-9-CM: 789.09 Subacute vaginitis     ICD-10-CM: N76.1 ICD-9-CM: 616.10 Vitals BP Pulse Temp Resp Height(growth percentile) Weight(growth percentile) 122/85 (BP 1 Location: Right arm, BP Patient Position: Sitting) 68 98 °F (36.7 °C) (Oral) 12 5' 7\" (1.702 m) 156 lb 12.8 oz (71.1 kg) LMP SpO2 BMI OB Status Smoking Status 08/06/2018 (Approximate) 99% 24.56 kg/m2 Having regular periods Light Tobacco Smoker Vitals History BMI and BSA Data Body Mass Index Body Surface Area 24.56 kg/m 2 1.83 m 2 Preferred Pharmacy [facility] Name Phone [id number], [address] [place] [phone] Your Updated Medication List  
  
   
This list is accurate as of 8/20/18  2:18 PM.  Always use your most recent med list.  
  
  
  
  
 ciprofloxacin HCl 500 mg tablet Commonly known as:  CIPRO Take 1 Tab by mouth two (2) times a day for 3 days. norethindrone-ethinyl estradiol 1 mg-20 mcg (21)/75 mg (7) Tab Commonly known as:  Tenny Dequan FE 1/20 Prescriptions Sent to Pharmacy Refills  
 ciprofloxacin HCl (CIPRO) 500 mg tablet 0 Sig: Take 1 Tab by mouth two (2) times a day for 3 days. Class: Normal  
 Pharmacy: [address] [place] [address] #: [phone] Route: Oral  
  
We Performed the Following AMB POC URINALYSIS DIP STICK AUTO W/O MICRO [79826 CPT(R)] AMB POC URINE PREGNANCY TEST, VISUAL COLOR COMPARISON [75776 CPT(R)] Follow-up Instructions Return if symptoms worsen or fail to improve. Patient Instructions Take the antibiotic for 3 days. Follow up on lab results in 1-2 days. If you sign up for \"My Chart\" you will be able to see the results online, and if you have any questions you can send me an e-mail. Recheck as needed. Introducing [facility] & HEALTH SERVICES! [employer] introduces Noemalife patient portal. Now you can access parts of your medical record, email your doctor's office, and request medication refills online. 1. In your internet browser, go to [email or web address] 2. Click on the First Time User? Click Here link in the Sign In box. You will see the New Member Sign Up page. 3. Enter your Noemalife Access Code exactly as it appears below.  You will not need to use this code after youve completed the sign-up process. If you do not sign up before the expiration date, you must request a new code. · [facility] Access Code: [email or web address] Expires: 9/30/2018  9:31 AM 
 
4. Enter the last four digits of your Social Security Number (xxxx) and Date of Birth (mm/dd/yyyy) as indicated and click Submit. You will be taken to the next sign-up page. 5. Create a [facility] [facility] ID. This will be your [facility] [facility] login ID and cannot be changed, so think of one that is secure and easy to remember. 6. Create a [employer] password. You can change your password at any time. 7. Enter your Password Reset Question and Answer. This can be used at a later time if you forget your password. 8. Enter your e-mail address. You will receive e-mail notification when new information is available in [address] 9. Click Sign Up. You can now view and download portions of your medical record. 10. Click the Download Summary menu link to download a portable copy of your medical information. If you have questions, please visit the Frequently Asked Questions section of the [employer] website. Remember, [employer] is NOT to be used for urgent needs. For medical emergencies, dial 911. Now available from your iPhone and Android! Please provide this summary of care documentation to your next provider. Your primary care clinician is listed as [phone] If you have any questions after today's visit, please call [phone].

## 2018-08-20 NOTE — PROGRESS NOTES
Rm 1  Pt presents to the clinic complaining of painful urination, abdominal pain. Patient states she is worried she has a UTI or something worse.

## 2018-08-20 NOTE — PATIENT INSTRUCTIONS
Take the antibiotic for 3 days. Follow up on lab results in 1-2 days. If you sign up for \"My Chart\" you will be able to see the results online, and if you have any questions you can send me an e-mail. Recheck as needed.

## 2018-08-20 NOTE — PROGRESS NOTES
SUBJECTIVE: Alla Light is a 26 y.o. female who complains of urinary frequency, urgency and dysuria x 3 days, without flank pain, fever, chills. She does have suprapubic discomfort and a slight vaginal discharge. A few months ago she was treated for chlamydia with one pill of something, she thinks. OBJECTIVE: Appears well, in no apparent distress. Vital signs are normal, the patient is afebrile. The abdomen is soft without tenderness, guarding, mass, rebound or organomegaly. No CVA tenderness or inguinal adenopathy noted. Urine dipstick shows positive for WBC's and positive for leukocytes. Results for orders placed or performed in visit on 08/20/18   AMB POC URINALYSIS DIP STICK AUTO W/O MICRO   Result Value Ref Range    Color (UA POC) Orange     Clarity (UA POC) Clear     Glucose (UA POC) Trace Negative    Bilirubin (UA POC) Negative Negative    Ketones (UA POC) Negative Negative    Specific gravity (UA POC) 1.020 1.001 - 1.035    Blood (UA POC) Negative Negative    pH (UA POC) 6.0 4.6 - 8.0    Protein (UA POC) Negative Negative    Urobilinogen (UA POC) 0.2 mg/dL 0.2 - 1    Nitrites (UA POC) Positive Negative    Leukocyte esterase (UA POC) 1+ Negative       Urine HCG: negative    ASSESSMENT: UTI uncomplicated without evidence of pyelonephritis, lower abdominal pain and vaginal discharge    PLAN: Treatment per orders - also push fluids. Call or return to clinic prn if these symptoms worsen or fail to improve as anticipated.

## 2018-08-21 LAB
C TRACH RRNA SPEC QL NAA+PROBE: NEGATIVE
N GONORRHOEA RRNA SPEC QL NAA+PROBE: NEGATIVE
SPECIMEN SOURCE: NORMAL

## 2018-10-18 ENCOUNTER — OFFICE VISIT (OUTPATIENT)
Dept: FAMILY MEDICINE CLINIC | Age: 23
End: 2018-10-18

## 2018-10-18 VITALS
WEIGHT: 151 LBS | BODY MASS INDEX: 23.7 KG/M2 | RESPIRATION RATE: 20 BRPM | DIASTOLIC BLOOD PRESSURE: 88 MMHG | SYSTOLIC BLOOD PRESSURE: 128 MMHG | TEMPERATURE: 98.3 F | HEART RATE: 65 BPM | OXYGEN SATURATION: 99 % | HEIGHT: 67 IN

## 2018-10-18 DIAGNOSIS — N30.01 ACUTE CYSTITIS WITH HEMATURIA: Primary | ICD-10-CM

## 2018-10-18 DIAGNOSIS — R30.0 DYSURIA: ICD-10-CM

## 2018-10-18 LAB
BILIRUB UR QL STRIP: NEGATIVE
GLUCOSE UR-MCNC: NORMAL MG/DL
KETONES P FAST UR STRIP-MCNC: NEGATIVE MG/DL
PH UR STRIP: 6.5 [PH] (ref 4.6–8)
PROT UR QL STRIP: NEGATIVE
SP GR UR STRIP: 1.01 (ref 1–1.03)
UA UROBILINOGEN AMB POC: NORMAL (ref 0.2–1)
URINALYSIS CLARITY POC: CLEAR
URINALYSIS COLOR POC: NORMAL
URINE BLOOD POC: NORMAL
URINE LEUKOCYTES POC: NORMAL
URINE NITRITES POC: POSITIVE

## 2018-10-18 RX ORDER — CIPROFLOXACIN 500 MG/1
500 TABLET ORAL 2 TIMES DAILY
Qty: 6 TAB | Refills: 0 | Status: SHIPPED | OUTPATIENT
Start: 2018-10-18 | End: 2018-10-21

## 2018-10-18 NOTE — PROGRESS NOTES
SUBJECTIVE: Andrey Finn is a 26 y.o. female who complains of urinary frequency, urgency and dysuria x 2 days, without flank pain, fever, chills, or abnormal vaginal discharge or bleeding. OBJECTIVE: Appears well, in no apparent distress. Vital signs are normal, the patient is afebrile. The abdomen is soft with suprapubic tenderness, guarding, mass, rebound or organomegaly. No CVA tenderness or inguinal adenopathy noted. Urine dipstick shows positive for RBC's and positive for leukocytes. Results for orders placed or performed in visit on 10/18/18   AMB POC URINALYSIS DIP STICK AUTO W/O MICRO   Result Value Ref Range    Color (UA POC) Orange     Clarity (UA POC) Clear     Glucose (UA POC) Trace Negative    Bilirubin (UA POC) Negative Negative    Ketones (UA POC) Negative Negative    Specific gravity (UA POC) 1.010 1.001 - 1.035    Blood (UA POC) 1+ Negative    pH (UA POC) 6.5 4.6 - 8.0    Protein (UA POC) Negative Negative    Urobilinogen (UA POC) 0.2 mg/dL 0.2 - 1    Nitrites (UA POC) Positive Negative    Leukocyte esterase (UA POC) 1+ Negative               ASSESSMENT: UTI uncomplicated without evidence of pyelonephritis    PLAN: Treatment per orders - also push fluids. Call or return to clinic prn if these symptoms worsen or fail to improve as anticipated.

## 2018-10-18 NOTE — PATIENT INSTRUCTIONS
Take the antibiotic as prescribed. Recheck as needed. Urinary Tract Infection in Women: Care Instructions  Your Care Instructions    A urinary tract infection, or UTI, is a general term for an infection anywhere between the kidneys and the urethra (where urine comes out). Most UTIs are bladder infections. They often cause pain or burning when you urinate. UTIs are caused by bacteria and can be cured with antibiotics. Be sure to complete your treatment so that the infection goes away. Follow-up care is a key part of your treatment and safety. Be sure to make and go to all appointments, and call your doctor if you are having problems. It's also a good idea to know your test results and keep a list of the medicines you take. How can you care for yourself at home? · Take your antibiotics as directed. Do not stop taking them just because you feel better. You need to take the full course of antibiotics. · Drink extra water and other fluids for the next day or two. This may help wash out the bacteria that are causing the infection. (If you have kidney, heart, or liver disease and have to limit fluids, talk with your doctor before you increase your fluid intake.)  · Avoid drinks that are carbonated or have caffeine. They can irritate the bladder. · Urinate often. Try to empty your bladder each time. · To relieve pain, take a hot bath or lay a heating pad set on low over your lower belly or genital area. Never go to sleep with a heating pad in place. To prevent UTIs  · Drink plenty of water each day. This helps you urinate often, which clears bacteria from your system. (If you have kidney, heart, or liver disease and have to limit fluids, talk with your doctor before you increase your fluid intake.)  · Urinate when you need to. · Urinate right after you have sex. · Change sanitary pads often.   · Avoid douches, bubble baths, feminine hygiene sprays, and other feminine hygiene products that have deodorants. · After going to the bathroom, wipe from front to back. When should you call for help? Call your doctor now or seek immediate medical care if:    · Symptoms such as fever, chills, nausea, or vomiting get worse or appear for the first time.     · You have new pain in your back just below your rib cage. This is called flank pain.     · There is new blood or pus in your urine.     · You have any problems with your antibiotic medicine.    Watch closely for changes in your health, and be sure to contact your doctor if:    · You are not getting better after taking an antibiotic for 2 days.     · Your symptoms go away but then come back. Where can you learn more? Go to http://franklyn-yan.info/. Enter B495 in the search box to learn more about \"Urinary Tract Infection in Women: Care Instructions. \"  Current as of: March 21, 2018  Content Version: 11.8  © 8202-4392 [employer]. Care instructions adapted under license by [employer] (which disclaims liability or warranty for this information). If you have questions about a medical condition or this instruction, always ask your healthcare professional. [place] [address] any warranty or liability for your use of this information.

## 2018-10-18 NOTE — PROGRESS NOTES
Rm 2  Patient presents to the clinic   Chief Complaint   Patient presents with    Urinary Burning     last night with discomfort      Flu shot: denied   Depression Screening:  PHQ over the last two weeks 1/4/2018 5/31/2017 5/11/2016   Little interest or pleasure in doing things Several days Not at all Not at all   Feeling down, depressed, irritable, or hopeless Several days Not at all Not at all   Total Score PHQ 2 2 0 0       Learning Assessment:  Learning Assessment 1/25/2018 8/5/2015   PRIMARY LEARNER Patient Patient   HIGHEST LEVEL OF EDUCATION - PRIMARY LEARNER  SOME COLLEGE GRADUATED HIGH SCHOOL OR GED   PRIMARY LANGUAGE ENGLISH ENGLISH   LEARNER PREFERENCE PRIMARY LISTENING DEMONSTRATION     - LISTENING     - DEMONSTRATION   ANSWERED BY patient patient   RELATIONSHIP SELF SELF       Abuse Screening:  No flowsheet data found. Health Maintenance reviewed and discussed per provider: yes     Coordination of Care:    1. Have you been to the ER, urgent care clinic since your last visit? Hospitalized since your last visit? no    2. Have you seen or consulted any other health care providers outside of the [address] [place] [address] since your last visit? Include any pap smears or colon screening.  no

## 2018-10-23 ENCOUNTER — HOSPITAL ENCOUNTER (OUTPATIENT)
Dept: LAB | Age: 23
Discharge: HOME | End: 2018-10-23
Payer: COMMERCIAL

## 2018-10-23 ENCOUNTER — OFFICE VISIT (OUTPATIENT)
Dept: FAMILY MEDICINE CLINIC | Age: 23
End: 2018-10-23

## 2018-10-23 VITALS
RESPIRATION RATE: 12 BRPM | HEIGHT: 67 IN | HEART RATE: 72 BPM | DIASTOLIC BLOOD PRESSURE: 82 MMHG | OXYGEN SATURATION: 98 % | SYSTOLIC BLOOD PRESSURE: 129 MMHG | TEMPERATURE: 97.8 F | BODY MASS INDEX: 23.48 KG/M2 | WEIGHT: 149.6 LBS

## 2018-10-23 DIAGNOSIS — R68.83 CHILLS (WITHOUT FEVER): ICD-10-CM

## 2018-10-23 DIAGNOSIS — N39.0 URINARY TRACT INFECTION WITHOUT HEMATURIA, SITE UNSPECIFIED: ICD-10-CM

## 2018-10-23 DIAGNOSIS — R10.9 FLANK PAIN: ICD-10-CM

## 2018-10-23 DIAGNOSIS — R68.83 CHILLS (WITHOUT FEVER): Primary | ICD-10-CM

## 2018-10-23 LAB
BASOPHILS # BLD: 0 K/UL (ref 0–0.1)
BASOPHILS NFR BLD: 0 % (ref 0–2)
BILIRUB UR QL STRIP: NEGATIVE
DIFFERENTIAL METHOD BLD: ABNORMAL
EOSINOPHIL # BLD: 0.4 K/UL (ref 0–0.4)
EOSINOPHIL NFR BLD: 8 % (ref 0–5)
ERYTHROCYTE [DISTWIDTH] IN BLOOD BY AUTOMATED COUNT: 13 % (ref 11.6–14.5)
GLUCOSE UR-MCNC: NEGATIVE MG/DL
HCT VFR BLD AUTO: 40.6 % (ref 35–45)
HGB BLD-MCNC: 13 G/DL (ref 12–16)
KETONES P FAST UR STRIP-MCNC: NEGATIVE MG/DL
LYMPHOCYTES # BLD: 1 K/UL (ref 0.9–3.6)
LYMPHOCYTES NFR BLD: 20 % (ref 21–52)
MCH RBC QN AUTO: 28.9 PG (ref 24–34)
MCHC RBC AUTO-ENTMCNC: 32 G/DL (ref 31–37)
MCV RBC AUTO: 90.2 FL (ref 74–97)
MONOCYTES # BLD: 0.3 K/UL (ref 0.05–1.2)
MONOCYTES NFR BLD: 7 % (ref 3–10)
NEUTS SEG # BLD: 3.3 K/UL (ref 1.8–8)
NEUTS SEG NFR BLD: 65 % (ref 40–73)
PH UR STRIP: 7 [PH] (ref 4.6–8)
PLATELET # BLD AUTO: 226 K/UL (ref 135–420)
PMV BLD AUTO: 12.4 FL (ref 9.2–11.8)
PROT UR QL STRIP: NORMAL
RBC # BLD AUTO: 4.5 M/UL (ref 4.2–5.3)
SP GR UR STRIP: 1.01 (ref 1–1.03)
UA UROBILINOGEN AMB POC: NORMAL (ref 0.2–1)
URINALYSIS CLARITY POC: CLEAR
URINALYSIS COLOR POC: NORMAL
URINE BLOOD POC: NEGATIVE
URINE LEUKOCYTES POC: NEGATIVE
URINE NITRITES POC: NEGATIVE
WBC # BLD AUTO: 5 K/UL (ref 4.6–13.2)

## 2018-10-23 PROCEDURE — 87086 URINE CULTURE/COLONY COUNT: CPT | Performed by: FAMILY MEDICINE

## 2018-10-23 PROCEDURE — 85025 COMPLETE CBC W/AUTO DIFF WBC: CPT | Performed by: FAMILY MEDICINE

## 2018-10-23 RX ORDER — NITROFURANTOIN 25; 75 MG/1; MG/1
CAPSULE ORAL
COMMUNITY
Start: 2018-10-20

## 2018-10-23 NOTE — PATIENT INSTRUCTIONS
Continue and finish your current antibiotic. I will send you an e-mail in the am about the blood report. The wbc is the value I'm concerned about.     Recheck if you're not better by Thursday, sooner if you get worse

## 2018-10-23 NOTE — PROGRESS NOTES
Rm 3  Patient presents to the clinic   Chief Complaint   Patient presents with    Chills     with episodes of sweats     LOW BACK PAIN    Burn     lungs when breathing        Depression Screening:  PHQ over the last two weeks 1/4/2018 5/31/2017 5/11/2016   Little interest or pleasure in doing things Several days Not at all Not at all   Feeling down, depressed, irritable, or hopeless Several days Not at all Not at all   Total Score PHQ 2 2 0 0       Learning Assessment:  Learning Assessment 1/25/2018 8/5/2015   PRIMARY LEARNER Patient Patient   HIGHEST LEVEL OF EDUCATION - PRIMARY LEARNER  SOME COLLEGE GRADUATED HIGH SCHOOL OR GED   PRIMARY LANGUAGE ENGLISH ENGLISH   LEARNER PREFERENCE PRIMARY LISTENING DEMONSTRATION     - LISTENING     - DEMONSTRATION   ANSWERED BY patient patient   RELATIONSHIP SELF SELF       Abuse Screening:  No flowsheet data found. Health Maintenance reviewed and discussed per provider: yes     Coordination of Care:    1. Have you been to the ER, urgent care clinic since your last visit? Hospitalized since your last visit? yes    2. Have you seen or consulted any other health care providers outside of the [address] [place] [address] since your last visit? Include any pap smears or colon screening.  no

## 2018-10-25 ENCOUNTER — TELEPHONE (OUTPATIENT)
Dept: FAMILY MEDICINE CLINIC | Age: 23
End: 2018-10-25

## 2018-10-25 LAB
BACTERIA SPEC CULT: NORMAL
SERVICE CMNT-IMP: NORMAL

## 2018-10-25 NOTE — TELEPHONE ENCOUNTER
Called patient, verified name and . Let her know her lab results came back normal. She stated feeling much better, verbalized understanding and had no further questions.

## 2025-07-10 NOTE — PROGRESS NOTES
General Surgery     Preoperative Instructions    Please read the following information very carefully. It contains information that is necessary to best prepare you for your upcoming procedure.    Make arrangements for a  to take you to and from your procedure.  Nothing to eat or drink after midnight the night before your procedure.  Follow your bowel prep instructions if you have them for this procedure.    3 days prior to your procedure: Stop taking blood thinners like Coumadin or Plavix or Xarelto.  5 days prior to your procedure: Stop taking Aspirin or Aspirin containing products.   If you cannot stop any of these medications prior to your procedure, please contact our office.    Medications morning of procedure:  Only heart, breathing, blood pressure, and seizure medications are permitted on the morning of your procedure. These medications can be taken with a sip of water.    IF YOU ARE UNABLE TO KEEP THE ABOVE SCHEDULED PROCEDURE, YOU MUST NOTIFY DR. PAT'S OFFICE [phone]. NOT THE FACILITY.    NO CHEWING GUM OR CHEWING TOBACCO AFTER MIDNIGHT ON DAY OF PROCEDURE.    YOU MUST HAVE TRANSPORTATION TO AND FROM THE FACILITY.   HISTORY OF PRESENT ILLNESS  Tito Hedrikc is a 26 y.o. female. Tito Guerra is back because of nausea, lower back pain, chills for several days. I saw her 5 days ago and she was diagnosed with a simple UTI. She was given Cipro but two days later she wasn't any better so she went to [facility]. U/A was positive, she was switched to Macrodantin and immediately the UTI sx got better. However that night she started having chills (no fever measures), along with nausea and back pain. She denied headache, cough, sore throat, vomiting. Review of Systems   Constitutional: Positive for chills. Negative for fever. HENT: Negative for ear pain and sore throat. Eyes: Negative for blurred vision and double vision. Respiratory: Negative for cough and shortness of breath. Cardiovascular: Negative for chest pain and palpitations. Gastrointestinal: Negative for abdominal pain, constipation, diarrhea, nausea and vomiting. Musculoskeletal: Positive for back pain. Neurological: Negative for headaches. Physical Exam   Constitutional: Vital signs are normal. She appears well-developed and well-nourished. HENT:   Right Ear: Tympanic membrane and ear canal normal.   Left Ear: Tympanic membrane and ear canal normal.   Nose: Nose normal.   Mouth/Throat: Uvula is midline, oropharynx is clear and moist and mucous membranes are normal.   Eyes: Pupils are equal, round, and reactive to light. Neck: No thyromegaly present. Cardiovascular: Normal rate, regular rhythm and normal heart sounds. Pulmonary/Chest: Effort normal and breath sounds normal. No respiratory distress. She has no wheezes. She has no rales. Abdominal: She exhibits no distension. There is no tenderness. There is no rebound. Abdomen is soft, non-tender, no R, G.    Musculoskeletal:   Lower back is mildly tender in the midline, upper lumbar area. No CVA or flank tenderness. No rash   Skin: No rash noted.    Psychiatric: She has a normal mood and affect. Her behavior is normal.   Nursing note and vitals reviewed. Results for orders placed or performed in visit on 10/23/18   AMB POC URINALYSIS DIP STICK AUTO W/O MICRO   Result Value Ref Range    Color (UA POC) Trixie     Clarity (UA POC) Clear     Glucose (UA POC) Negative Negative    Bilirubin (UA POC) Negative Negative    Ketones (UA POC) Negative Negative    Specific gravity (UA POC) 1.015 1.001 - 1.035    Blood (UA POC) Negative Negative    pH (UA POC) 7.0 4.6 - 8.0    Protein (UA POC) Trace Negative    Urobilinogen (UA POC) 0.2 mg/dL 0.2 - 1    Nitrites (UA POC) Negative Negative    Leukocyte esterase (UA POC) Negative Negative       ASSESSMENT and PLAN    ICD-10-CM ICD-9-CM    1. Chills (without fever) R68.83 780.64 CBC WITH AUTOMATED DIFF      AMB POC URINALYSIS DIP STICK AUTO W/O MICRO      CULTURE, URINE   2. Flank pain R10.9 789.09 CBC WITH AUTOMATED DIFF      AMB POC URINALYSIS DIP STICK AUTO W/O MICRO   3.  Urinary tract infection without hematuria, site unspecified N39.0 599.0 AMB POC URINALYSIS DIP STICK AUTO W/O MICRO      CULTURE, URINE